# Patient Record
Sex: MALE | Race: BLACK OR AFRICAN AMERICAN | NOT HISPANIC OR LATINO | URBAN - METROPOLITAN AREA
[De-identification: names, ages, dates, MRNs, and addresses within clinical notes are randomized per-mention and may not be internally consistent; named-entity substitution may affect disease eponyms.]

---

## 2021-02-16 ENCOUNTER — INPATIENT (INPATIENT)
Facility: HOSPITAL | Age: 51
LOS: 0 days | Discharge: HOME | DRG: 603 | End: 2021-02-16
Attending: STUDENT IN AN ORGANIZED HEALTH CARE EDUCATION/TRAINING PROGRAM | Admitting: HOSPITALIST
Payer: COMMERCIAL

## 2021-02-16 ENCOUNTER — TRANSCRIPTION ENCOUNTER (OUTPATIENT)
Age: 51
End: 2021-02-16

## 2021-02-16 VITALS
TEMPERATURE: 99 F | DIASTOLIC BLOOD PRESSURE: 106 MMHG | RESPIRATION RATE: 18 BRPM | HEART RATE: 92 BPM | OXYGEN SATURATION: 98 % | SYSTOLIC BLOOD PRESSURE: 162 MMHG

## 2021-02-16 VITALS
WEIGHT: 265 LBS | SYSTOLIC BLOOD PRESSURE: 176 MMHG | RESPIRATION RATE: 18 BRPM | HEART RATE: 106 BPM | HEIGHT: 71 IN | DIASTOLIC BLOOD PRESSURE: 98 MMHG | OXYGEN SATURATION: 97 % | TEMPERATURE: 99 F

## 2021-02-16 DIAGNOSIS — E03.9 HYPOTHYROIDISM, UNSPECIFIED: ICD-10-CM

## 2021-02-16 DIAGNOSIS — L03.314 CELLULITIS OF GROIN: ICD-10-CM

## 2021-02-16 DIAGNOSIS — I10 ESSENTIAL (PRIMARY) HYPERTENSION: ICD-10-CM

## 2021-02-16 DIAGNOSIS — Z98.890 OTHER SPECIFIED POSTPROCEDURAL STATES: Chronic | ICD-10-CM

## 2021-02-16 DIAGNOSIS — N17.9 ACUTE KIDNEY FAILURE, UNSPECIFIED: ICD-10-CM

## 2021-02-16 DIAGNOSIS — Z29.9 ENCOUNTER FOR PROPHYLACTIC MEASURES, UNSPECIFIED: ICD-10-CM

## 2021-02-16 DIAGNOSIS — D64.9 ANEMIA, UNSPECIFIED: ICD-10-CM

## 2021-02-16 LAB
% ALBUMIN: 57.8 % — SIGNIFICANT CHANGE UP
% ALPHA 1: 4.7 % — SIGNIFICANT CHANGE UP
% ALPHA 2: 11.1 % — SIGNIFICANT CHANGE UP
% BETA: 10 % — SIGNIFICANT CHANGE UP
% GAMMA: 16.4 % — SIGNIFICANT CHANGE UP
ALBUMIN SERPL ELPH-MCNC: 4.1 G/DL — SIGNIFICANT CHANGE UP (ref 3.3–5)
ALBUMIN SERPL ELPH-MCNC: 4.1 G/DL — SIGNIFICANT CHANGE UP (ref 3.6–5.5)
ALBUMIN/GLOB SERPL ELPH: 1.4 RATIO — SIGNIFICANT CHANGE UP
ALP SERPL-CCNC: 70 U/L — SIGNIFICANT CHANGE UP (ref 40–120)
ALPHA1 GLOB SERPL ELPH-MCNC: 0.3 G/DL — SIGNIFICANT CHANGE UP (ref 0.1–0.4)
ALPHA2 GLOB SERPL ELPH-MCNC: 0.8 G/DL — SIGNIFICANT CHANGE UP (ref 0.5–1)
ALT FLD-CCNC: 17 U/L — SIGNIFICANT CHANGE UP (ref 10–45)
ANION GAP SERPL CALC-SCNC: 10 MMOL/L — SIGNIFICANT CHANGE UP (ref 5–17)
ANISOCYTOSIS BLD QL: SLIGHT — SIGNIFICANT CHANGE UP
APPEARANCE UR: CLEAR — SIGNIFICANT CHANGE UP
AST SERPL-CCNC: 19 U/L — SIGNIFICANT CHANGE UP (ref 10–40)
B-GLOBULIN SERPL ELPH-MCNC: 0.7 G/DL — SIGNIFICANT CHANGE UP (ref 0.5–1)
BACTERIA # UR AUTO: NEGATIVE — SIGNIFICANT CHANGE UP
BASOPHILS # BLD AUTO: 0 K/UL — SIGNIFICANT CHANGE UP (ref 0–0.2)
BASOPHILS NFR BLD AUTO: 0 % — SIGNIFICANT CHANGE UP (ref 0–2)
BILIRUB SERPL-MCNC: 0.4 MG/DL — SIGNIFICANT CHANGE UP (ref 0.2–1.2)
BILIRUB UR-MCNC: NEGATIVE — SIGNIFICANT CHANGE UP
BUN SERPL-MCNC: 15 MG/DL — SIGNIFICANT CHANGE UP (ref 7–23)
CALCIUM SERPL-MCNC: 9.3 MG/DL — SIGNIFICANT CHANGE UP (ref 8.4–10.5)
CHLORIDE SERPL-SCNC: 104 MMOL/L — SIGNIFICANT CHANGE UP (ref 96–108)
CO2 SERPL-SCNC: 26 MMOL/L — SIGNIFICANT CHANGE UP (ref 22–31)
COLOR SPEC: YELLOW — SIGNIFICANT CHANGE UP
CREAT ?TM UR-MCNC: 309 MG/DL — SIGNIFICANT CHANGE UP
CREAT SERPL-MCNC: 1.75 MG/DL — HIGH (ref 0.5–1.3)
DIFF PNL FLD: NEGATIVE — SIGNIFICANT CHANGE UP
EOSINOPHIL # BLD AUTO: 0.13 K/UL — SIGNIFICANT CHANGE UP (ref 0–0.5)
EOSINOPHIL NFR BLD AUTO: 1.8 % — SIGNIFICANT CHANGE UP (ref 0–6)
EPI CELLS # UR: 1 /HPF — SIGNIFICANT CHANGE UP
FERRITIN SERPL-MCNC: 135 NG/ML — SIGNIFICANT CHANGE UP (ref 30–400)
GAMMA GLOBULIN: 1.2 G/DL — SIGNIFICANT CHANGE UP (ref 0.6–1.6)
GLUCOSE SERPL-MCNC: 112 MG/DL — HIGH (ref 70–99)
GLUCOSE UR QL: NEGATIVE — SIGNIFICANT CHANGE UP
HCT VFR BLD CALC: 38.2 % — LOW (ref 39–50)
HGB BLD-MCNC: 11.9 G/DL — LOW (ref 13–17)
HYALINE CASTS # UR AUTO: 0 /LPF — SIGNIFICANT CHANGE UP (ref 0–2)
HYPOCHROMIA BLD QL: SLIGHT — SIGNIFICANT CHANGE UP
IRON SATN MFR SERPL: 16 UG/DL — LOW (ref 45–165)
IRON SATN MFR SERPL: 6 % — LOW (ref 16–55)
KETONES UR-MCNC: NEGATIVE — SIGNIFICANT CHANGE UP
LEUKOCYTE ESTERASE UR-ACNC: NEGATIVE — SIGNIFICANT CHANGE UP
LYMPHOCYTES # BLD AUTO: 0.98 K/UL — LOW (ref 1–3.3)
LYMPHOCYTES # BLD AUTO: 13.3 % — SIGNIFICANT CHANGE UP (ref 13–44)
MANUAL SMEAR VERIFICATION: SIGNIFICANT CHANGE UP
MCHC RBC-ENTMCNC: 21.9 PG — LOW (ref 27–34)
MCHC RBC-ENTMCNC: 31.2 GM/DL — LOW (ref 32–36)
MCV RBC AUTO: 70.3 FL — LOW (ref 80–100)
MICROCYTES BLD QL: SIGNIFICANT CHANGE UP
MONOCYTES # BLD AUTO: 0.26 K/UL — SIGNIFICANT CHANGE UP (ref 0–0.9)
MONOCYTES NFR BLD AUTO: 3.5 % — SIGNIFICANT CHANGE UP (ref 2–14)
NEUTROPHILS # BLD AUTO: 5.83 K/UL — SIGNIFICANT CHANGE UP (ref 1.8–7.4)
NEUTROPHILS NFR BLD AUTO: 78.8 % — HIGH (ref 43–77)
NITRITE UR-MCNC: NEGATIVE — SIGNIFICANT CHANGE UP
OVALOCYTES BLD QL SMEAR: SLIGHT — SIGNIFICANT CHANGE UP
PH UR: 6.5 — SIGNIFICANT CHANGE UP (ref 5–8)
PLAT MORPH BLD: NORMAL — SIGNIFICANT CHANGE UP
PLATELET # BLD AUTO: 258 K/UL — SIGNIFICANT CHANGE UP (ref 150–400)
POLYCHROMASIA BLD QL SMEAR: SLIGHT — SIGNIFICANT CHANGE UP
POTASSIUM SERPL-MCNC: 4.2 MMOL/L — SIGNIFICANT CHANGE UP (ref 3.5–5.3)
POTASSIUM SERPL-SCNC: 4.2 MMOL/L — SIGNIFICANT CHANGE UP (ref 3.5–5.3)
PROCALCITONIN SERPL-MCNC: 0.09 NG/ML — SIGNIFICANT CHANGE UP (ref 0.02–0.1)
PROT ?TM UR-MCNC: 18 MG/DL — HIGH (ref 0–12)
PROT PATTERN SERPL ELPH-IMP: SIGNIFICANT CHANGE UP
PROT SERPL-MCNC: 7.1 G/DL — SIGNIFICANT CHANGE UP (ref 6–8.3)
PROT SERPL-MCNC: 7.1 G/DL — SIGNIFICANT CHANGE UP (ref 6–8.3)
PROT UR-MCNC: ABNORMAL
PROT/CREAT UR-RTO: 0.1 RATIO — SIGNIFICANT CHANGE UP (ref 0–0.2)
RBC # BLD: 5.43 M/UL — SIGNIFICANT CHANGE UP (ref 4.2–5.8)
RBC # FLD: 15.8 % — HIGH (ref 10.3–14.5)
RBC BLD AUTO: ABNORMAL
RBC CASTS # UR COMP ASSIST: 1 /HPF — SIGNIFICANT CHANGE UP (ref 0–4)
SARS-COV-2 IGG SERPL QL IA: NEGATIVE — SIGNIFICANT CHANGE UP
SARS-COV-2 IGM SERPL IA-ACNC: 0.15 INDEX — SIGNIFICANT CHANGE UP
SARS-COV-2 RNA SPEC QL NAA+PROBE: SIGNIFICANT CHANGE UP
SODIUM SERPL-SCNC: 140 MMOL/L — SIGNIFICANT CHANGE UP (ref 135–145)
SODIUM UR-SCNC: 188 MMOL/L — SIGNIFICANT CHANGE UP
SP GR SPEC: 1.03 — HIGH (ref 1.01–1.02)
TIBC SERPL-MCNC: 260 UG/DL — SIGNIFICANT CHANGE UP (ref 220–430)
TRANSFERRIN SERPL-MCNC: 212 MG/DL — SIGNIFICANT CHANGE UP (ref 200–360)
UIBC SERPL-MCNC: 244 UG/DL — SIGNIFICANT CHANGE UP (ref 110–370)
UROBILINOGEN FLD QL: ABNORMAL
VARIANT LYMPHS # BLD: 2.6 % — SIGNIFICANT CHANGE UP (ref 0–6)
WBC # BLD: 7.4 K/UL — SIGNIFICANT CHANGE UP (ref 3.8–10.5)
WBC # FLD AUTO: 7.4 K/UL — SIGNIFICANT CHANGE UP (ref 3.8–10.5)
WBC UR QL: 1 /HPF — SIGNIFICANT CHANGE UP (ref 0–5)

## 2021-02-16 PROCEDURE — U0003: CPT

## 2021-02-16 PROCEDURE — 84300 ASSAY OF URINE SODIUM: CPT

## 2021-02-16 PROCEDURE — G0378: CPT

## 2021-02-16 PROCEDURE — 96375 TX/PRO/DX INJ NEW DRUG ADDON: CPT

## 2021-02-16 PROCEDURE — 99285 EMERGENCY DEPT VISIT HI MDM: CPT | Mod: 25

## 2021-02-16 PROCEDURE — 80053 COMPREHEN METABOLIC PANEL: CPT

## 2021-02-16 PROCEDURE — 82570 ASSAY OF URINE CREATININE: CPT

## 2021-02-16 PROCEDURE — 96376 TX/PRO/DX INJ SAME DRUG ADON: CPT

## 2021-02-16 PROCEDURE — 83550 IRON BINDING TEST: CPT

## 2021-02-16 PROCEDURE — 84145 PROCALCITONIN (PCT): CPT

## 2021-02-16 PROCEDURE — 72193 CT PELVIS W/DYE: CPT

## 2021-02-16 PROCEDURE — 99285 EMERGENCY DEPT VISIT HI MDM: CPT

## 2021-02-16 PROCEDURE — 83540 ASSAY OF IRON: CPT

## 2021-02-16 PROCEDURE — U0005: CPT

## 2021-02-16 PROCEDURE — 84165 PROTEIN E-PHORESIS SERUM: CPT

## 2021-02-16 PROCEDURE — 99223 1ST HOSP IP/OBS HIGH 75: CPT

## 2021-02-16 PROCEDURE — 84155 ASSAY OF PROTEIN SERUM: CPT

## 2021-02-16 PROCEDURE — 86769 SARS-COV-2 COVID-19 ANTIBODY: CPT

## 2021-02-16 PROCEDURE — 72193 CT PELVIS W/DYE: CPT | Mod: 26,MA

## 2021-02-16 PROCEDURE — 12345: CPT | Mod: NC

## 2021-02-16 PROCEDURE — 81001 URINALYSIS AUTO W/SCOPE: CPT

## 2021-02-16 PROCEDURE — 84466 ASSAY OF TRANSFERRIN: CPT

## 2021-02-16 PROCEDURE — 84156 ASSAY OF PROTEIN URINE: CPT

## 2021-02-16 PROCEDURE — 96374 THER/PROPH/DIAG INJ IV PUSH: CPT

## 2021-02-16 PROCEDURE — 85025 COMPLETE CBC W/AUTO DIFF WBC: CPT

## 2021-02-16 PROCEDURE — 82728 ASSAY OF FERRITIN: CPT

## 2021-02-16 RX ORDER — LEVOTHYROXINE SODIUM 125 MCG
125 TABLET ORAL DAILY
Refills: 0 | Status: DISCONTINUED | OUTPATIENT
Start: 2021-02-16 | End: 2021-02-16

## 2021-02-16 RX ORDER — CEFOTETAN DISODIUM 1 G
2 VIAL (EA) INJECTION EVERY 12 HOURS
Refills: 0 | Status: DISCONTINUED | OUTPATIENT
Start: 2021-02-16 | End: 2021-02-16

## 2021-02-16 RX ORDER — AMLODIPINE BESYLATE 2.5 MG/1
10 TABLET ORAL DAILY
Refills: 0 | Status: DISCONTINUED | OUTPATIENT
Start: 2021-02-17 | End: 2021-02-16

## 2021-02-16 RX ORDER — CARVEDILOL PHOSPHATE 80 MG/1
12.5 CAPSULE, EXTENDED RELEASE ORAL DAILY
Refills: 0 | Status: DISCONTINUED | OUTPATIENT
Start: 2021-02-16 | End: 2021-02-16

## 2021-02-16 RX ORDER — CEFOTETAN DISODIUM 1 G
2 VIAL (EA) INJECTION ONCE
Refills: 0 | Status: COMPLETED | OUTPATIENT
Start: 2021-02-16 | End: 2021-02-16

## 2021-02-16 RX ORDER — SODIUM CHLORIDE 9 MG/ML
1000 INJECTION, SOLUTION INTRAVENOUS ONCE
Refills: 0 | Status: COMPLETED | OUTPATIENT
Start: 2021-02-16 | End: 2021-02-16

## 2021-02-16 RX ORDER — KETOROLAC TROMETHAMINE 30 MG/ML
15 SYRINGE (ML) INJECTION ONCE
Refills: 0 | Status: DISCONTINUED | OUTPATIENT
Start: 2021-02-16 | End: 2021-02-16

## 2021-02-16 RX ORDER — AMLODIPINE BESYLATE 2.5 MG/1
10 TABLET ORAL ONCE
Refills: 0 | Status: COMPLETED | OUTPATIENT
Start: 2021-02-16 | End: 2021-02-16

## 2021-02-16 RX ORDER — CEFPODOXIME PROXETIL 100 MG
1 TABLET ORAL
Qty: 14 | Refills: 0
Start: 2021-02-16 | End: 2021-02-22

## 2021-02-16 RX ADMIN — CARVEDILOL PHOSPHATE 12.5 MILLIGRAM(S): 80 CAPSULE, EXTENDED RELEASE ORAL at 06:29

## 2021-02-16 RX ADMIN — Medication 100 GRAM(S): at 03:45

## 2021-02-16 RX ADMIN — Medication 110 MILLIGRAM(S): at 15:49

## 2021-02-16 RX ADMIN — SODIUM CHLORIDE 4000 MILLILITER(S): 9 INJECTION, SOLUTION INTRAVENOUS at 03:11

## 2021-02-16 RX ADMIN — Medication 100 GRAM(S): at 15:19

## 2021-02-16 RX ADMIN — Medication 110 MILLIGRAM(S): at 03:56

## 2021-02-16 RX ADMIN — Medication 125 MICROGRAM(S): at 06:29

## 2021-02-16 RX ADMIN — AMLODIPINE BESYLATE 10 MILLIGRAM(S): 2.5 TABLET ORAL at 05:54

## 2021-02-16 RX ADMIN — Medication 15 MILLIGRAM(S): at 03:11

## 2021-02-16 NOTE — ED ADULT NURSE NOTE - NSIMPLEMENTINTERV_GEN_ALL_ED
Implemented All Universal Safety Interventions:  [place] to call system. Call bell, personal items and telephone within reach. Instruct patient to call for assistance. Room bathroom lighting operational. Non-slip footwear when patient is off stretcher. Physically safe environment: no spills, clutter or unnecessary equipment. Stretcher in lowest position, wheels locked, appropriate side rails in place.

## 2021-02-16 NOTE — ED ADULT NURSE NOTE - OBJECTIVE STATEMENT
Pt is a 49 y/o male pmhx of HTN presents to the ED complaining of perianal rash x 2 days. Pt says he has had these little bump/rashes before on his legs that were small, but they would pop and go away. He says this one is causing him pain and has brown yellow drainage. Endorses having chills. Pt presents alert & oriented x 4, breathing spontaneous & nonlabored, Strength 5/5 x 4 extremities, ambulates without assist. Strong peripheral pulses noted b/l, no edema noted. Denies chest pain, SOB, abdominal pain, back pain, n/v/d, fever. Call bell within reach, bed in lowest position, side rails up, wheels locked.

## 2021-02-16 NOTE — PROVIDER CONTACT NOTE (OTHER) - ASSESSMENT
patient has hx hypertension, other vss, denies pain palpitations sob headache dizziness. patient states his bp is normally this high

## 2021-02-16 NOTE — H&P ADULT - NSHPPHYSICALEXAM_GEN_ALL_CORE
PHYSICAL EXAM:   GENERAL: Alert. Not confused. No acute distress. Not thin. Not cachectic. Not obese.  HEAD:  Atraumatic. Normocephalic.  EYES: EOMI. PERRLA. Normal conjunctiva/sclera.  ENT: Neck supple. No JVD. Moist oral mucosa. Not edentulous. No thrush.  LYMPH: Normal supraclavicular/cervical lymph nodes.   CARDIAC: No tachy, Not chen. Regular rhythm. Not irregularly irregular. S1. S2. No murmur. No rub. No distant heart sounds.  LUNG/CHEST: CTAB. BS equal bilaterally. No wheezes. No rales. No rhonchi.  ABDOMEN: Soft. No tenderness. No distension. No fluid wave. Normal bowel sounds.  BACK: No midline/vertebral tenderness. No flank tenderness.  VASCULAR: +2 b/l radial pulses. Palpable DP pulses.  EXTREMITIES:  No clubbing. No cyanosis. No edema. Moving all 4.  NEUROLOGY: A&Ox3. Non-focal exam. Cranial nerves intact. Normal speech. Sensation intact.  PSYCH: Normal behavior. Normal affect.  SKIN: + nontender, erythematous left perineal lesion (about 3 cm x1 cm) without active purulent drainage, slight induration, no crepitus  Vascular Access:     ICU Vital Signs Last 24 Hrs  T(C): 36.8 (16 Feb 2021 05:59), Max: 37.3 (16 Feb 2021 04:07)  T(F): 98.2 (16 Feb 2021 05:59), Max: 99.1 (16 Feb 2021 04:07)  HR: 88 (16 Feb 2021 05:59) (87 - 106)  BP: 162/105 (16 Feb 2021 05:59) (162/105 - 184/104)  BP(mean): --  ABP: --  ABP(mean): --  RR: 20 (16 Feb 2021 05:59) (18 - 20)  SpO2: 98% (16 Feb 2021 05:59) (97% - 100%)      I&O's Summary

## 2021-02-16 NOTE — H&P ADULT - HISTORY OF PRESENT ILLNESS
50 yr old man with PMH of hypertension and hypothyroidism who presents with L inner groin abscess. Pt endorses swelling, rash, and drainage for the last 3 days.  Patient notes that on Saturday he noticed a "small bump" in his left groin region which was larger in size appeared fluid filled on Sunday. Yesterday it appeared to look like a boil and wife squeezed it which caused it to burst.   He then noted a purulent yellowish/ brownish and bloody discharge with a foul odor. It has continued to drain since then w/ a foul odor.   Denies fevers and chills.      In ED, CT obtained showing Mild cellulitis of the left medial upper thigh and perineum. No abscess or subcutaneous gas collection. Low clinical concern for raymundo's.

## 2021-02-16 NOTE — CHART NOTE - NSCHARTNOTEFT_GEN_A_CORE
Patient seen and examined  Pain is much improved. No fever or chills.    1x2cm area of inflamed tissue w/ opening and serous drainage. No significant surrounding erythema.  CT neg for abscess  Suspect mild cellulitis due to furuncle, already self draining.  No fever, wbc or systemic signs of infection.   He is medically stable for discharge home today. Will complete 7 days of oral abx -doxy 100 bid and cefpodoxime 400 bid  Encourage pt to clean area w/ clean gauze/water 2 x daily and cover with gauze.  D/w PMD b/l creatinine is 1.5. Mild cr bump in setting of infection. Encourage pt to increase PO intake. Can have repeat labs as out pt to follow up on creatinine and mild anemia.    40 min spent coordinating care and planning for discharge

## 2021-02-16 NOTE — H&P ADULT - NSHPREVIEWOFSYSTEMS_GEN_ALL_CORE
REVIEW OF SYSTEMS:  CONSTITUTIONAL: No weakness. No fevers. No chills. No rigors. No weight loss. No night sweats. No poor appetite.  EYES: No blurry or double vision. No eye pain.  ENT: No hearing difficulty. No vertigo. No dysphagia. No sore throat. No Sinusitis/rhinorrhea.   NECK: No pain. No stiffness/rigidity.  CARDIAC: No chest pain. No palpitations. No lightheadedness. No syncope.  RESPIRATORY: No cough. No SOB. No hemoptysis.  GASTROINTESTINAL: No abdominal pain. No nausea. No vomiting. No hematemesis. No diarrhea. No constipation. No melena. No hematochezia.  GENITOURINARY: No dysuria. No frequency. No hesitancy. No hematuria. No oliguria.  NEUROLOGICAL: No numbness/tingling. No focal weakness. No urinary or fecal incontinence. No headache. No unsteady gait.  BACK: No back pain. No flank pain.  EXTREMITIES: No lower extremity edema. Full ROM. No joint pain.  SKIN: See HPI. No itching. No other lesions.  PSYCHIATRIC: No depression. No anxiety. No SI/HI.  ALLERGIC: No lip swelling. No hives.  All other review of systems is negative unless indicated above.  Unless indicated above, unable to assess ROS 2/2

## 2021-02-16 NOTE — H&P ADULT - PROBLEM SELECTOR PLAN 5
Elevated at admission; pt endorses not having taken any BP meds today  -c/w home med per pt: carvedilol 12.5 mg qd and norvasc 10 mg  - confirm med rec in am w/ pharamcy regarding carvedilol dosing (bid vs qd)

## 2021-02-16 NOTE — ED ADULT TRIAGE NOTE - CHIEF COMPLAINT QUOTE
Abscess in left groin. Worsening and growing over past 2 days. Reports pain, red/brown malodorous discharge.

## 2021-02-16 NOTE — DISCHARGE NOTE NURSING/CASE MANAGEMENT/SOCIAL WORK - PATIENT PORTAL LINK FT
You can access the FollowMyHealth Patient Portal offered by [facility] by registering at the following website: [email or web address]. By joining [employer]’s FollowMyHealth portal, you will also be able to view your health information using other applications (apps) compatible with our system.

## 2021-02-16 NOTE — H&P ADULT - ASSESSMENT
Telephone Encounter by Emerald Curiel at 11/21/17 04:15 PM     Author:  Emerald Curiel Service:  (none) Author Type:       Filed:  11/21/17 04:16 PM Encounter Date:  11/21/2017 Status:  Signed     :  Emerald Curiel ()            Received fax from[AC1.1T] [facility]'s division of gastroenterology and hepatology and nutrition Dr. Goldman's patient visit notes date of service 11/21/2017.[AC1.1M]  Fax placed in clinical staff work bin.[AC1.1T]  Electronically Signed by:    Emerald Curiel 11/21/2017[AC1.2T]          Revision History        User Key Date/Time User Provider Type Action    > AC1.2 11/21/17 04:16 PM Emerald Curiel  Sign     AC1.1 11/21/17 04:15 PM Emerald Curiel      M - Manual, T - Template             50 yr old gentleman w/ PMH of hypothyroidism and hypertension presents w/ L inguinal draining abscess. Lab findings also notable for anemia and LEROY w/ unclear baseline.

## 2021-02-16 NOTE — H&P ADULT - NSICDXFAMILYHX_GEN_ALL_CORE_FT
FAMILY HISTORY:  Family history of diabetes mellitus, dad, insulin dependent    Father  Still living? Unknown  Family history of prostate cancer, Age at diagnosis: Age Unknown    Mother  Still living? Unknown  Family history of hypertension, Age at diagnosis: Age Unknown

## 2021-02-16 NOTE — ED PROVIDER NOTE - SKIN, MLM
erythematous left perineal area with serosanginous drainage, slight fluctuance and induration, no crepitus

## 2021-02-16 NOTE — DISCHARGE NOTE PROVIDER - CARE PROVIDER_API CALL
Dylan Lobo  [facility]  Phone: [phone]  Fax: (   )    -  Follow Up Time: 1 week

## 2021-02-16 NOTE — DISCHARGE NOTE PROVIDER - PROVIDER TOKENS
FREE:[LAST:[Yamil],FIRST:[Dylan],PHONE:[phone]],FAX:[(   )    -],ADDRESS:[[facility]],FOLLOWUP:[1 week]]

## 2021-02-16 NOTE — ED ADULT NURSE REASSESSMENT NOTE - NS ED NURSE REASSESS COMMENT FT1
received pt from RN Candi for continued care. pt adm to hospital for eval of abscess to L groin area . pt is aaox4, sitting upright in stretcher watching tv. received morning BP meds. has call bell in reach

## 2021-02-16 NOTE — H&P ADULT - NSHPLABSRESULTS_GEN_ALL_CORE
Personally reviewed labs and imaging.                        11.9   7.40  )-----------( 258      ( 2021 02:08 )             38.2       02-16    140  |  104  |  15  ----------------------------<  112<H>  4.2   |  26  |  1.75<H>    Ca    9.3      2021 02:08    TPro  7.1  /  Alb  4.1  /  TBili  0.4  /  DBili  x   /  AST  19  /  ALT  17  /  AlkPhos  70  02-16  LIVER FUNCTIONS - ( 2021 02:08 )  Alb: 4.1 g/dL / Pro: 7.1 g/dL / ALK PHOS: 70 U/L / ALT: 17 U/L / AST: 19 U/L / GGT: x           Urinalysis Basic - ( 2021 04:17 )  Color: Yellow / Appearance: Clear / S.030 / pH: x  Gluc: x / Ketone: Negative  / Bili: Negative / Urobili: 4 mg/dL   Blood: x / Protein: Trace / Nitrite: Negative   Leuk Esterase: Negative / RBC: 1 /hpf / WBC 1 /HPF   Sq Epi: x / Non Sq Epi: 1 /hpf / Bacteria: Negative    CT w/ Mild cellulitis of the left medial upper thigh and perineum. No abscess or subcutaneous gas collection.

## 2021-02-16 NOTE — DISCHARGE NOTE PROVIDER - NSDCMRMEDTOKEN_GEN_ALL_CORE_FT
amLODIPine 10 mg oral tablet: 1 tab(s) orally once a day  carvedilol 12.5 mg oral tablet: 1 tab(s) orally once a day  levothyroxine 125 mcg (0.125 mg) oral tablet: 1 tab(s) orally once a day   amLODIPine 10 mg oral tablet: 1 tab(s) orally once a day  carvedilol 12.5 mg oral tablet: 1 tab(s) orally once a day  cefpodoxime 200 mg oral tablet: 1 tab(s) orally 2 times a day   doxycycline hyclate 100 mg oral tablet: 1 tab(s) orally 2 times a day   levothyroxine 125 mcg (0.125 mg) oral tablet: 1 tab(s) orally once a day

## 2021-02-16 NOTE — DISCHARGE NOTE PROVIDER - NSDCCPCAREPLAN_GEN_ALL_CORE_FT
PRINCIPAL DISCHARGE DIAGNOSIS  Diagnosis: Cellulitis of groin  Assessment and Plan of Treatment: You were found to have a skin infection of the left leg/groin. You have been prescribed two antibiotics to treat this -doxycycline and cefpodoxime. Both are to be taken twice a day for a total of 7 days. You should feel clear improvement in 1-2 days. If you have worsening pain, redness or drainage at the site, or develop high fevers or feel unwell Please seek urgent medical care. Please follow up with your PMD in 1-2 weeks regardless.      SECONDARY DISCHARGE DIAGNOSES  Diagnosis: Chronic kidney disease  Assessment and Plan of Treatment: You were incidentally found to have reduced kidney function. A number called your creatinine was elevated at 1.7 (normal 1). Please be sure to drink fluids and stay hydrated to help your kidneys. Please follow up with your PMD in 1-2 weeks for repeat blood work to monitor your kidney function.    Diagnosis: Anemia  Assessment and Plan of Treatment: You were also noted to have mild anemia with a hemoglobin of 11.9. This should be investigated further by your PMD for causes of anemia such as iron deficiency and you may need additional testing. Please follow up with your PMD as requested.     PRINCIPAL DISCHARGE DIAGNOSIS  Diagnosis: Cellulitis of groin  Assessment and Plan of Treatment: You were found to have a skin infection of the left leg/groin. You have been prescribed two antibiotics to treat this -doxycycline and cefpodoxime. Both are to be taken twice a day for a total of 7 days. You should feel clear improvement in 1-2 days. Please clean the area daily with clean water and gentle soap and keep covered with clean gauze. If you have worsening pain, redness or drainage at the site, or develop high fevers or feel unwell please seek urgent medical care. Please follow up with your PMD in 1-2 weeks regardless to ensure proper healing.      SECONDARY DISCHARGE DIAGNOSES  Diagnosis: Anemia  Assessment and Plan of Treatment: You were also noted to have mild anemia with a hemoglobin of 11.9. This should be investigated further by your PMD for causes of anemia such as iron deficiency and you may need additional testing. Please follow up with your PMD as requested.    Diagnosis: Chronic kidney disease  Assessment and Plan of Treatment: You were incidentally found to have reduced kidney function. A number called your creatinine was elevated at 1.7 (normal 1). Please be sure to drink fluids and stay hydrated to help your kidneys. Please follow up with your PMD in 1-2 weeks for repeat blood work to monitor your kidney function.

## 2021-02-16 NOTE — DISCHARGE NOTE PROVIDER - HOSPITAL COURSE
50 yr old gentleman w/ PMH of hypothyroidism and hypertension presents w/ L inguinal draining abscess. No clinical evidence of Fourniers and no deep tissue infection. Lab findings also notable for anemia and elevated creatinine. Confirmed b/w PCP b/l creatinine 1.3-1.5. Suspect mild LEROY vs progression of known CKD. S/p IVF. Patient encouraged to increase PO fluid and follow up PMD for repeat labs in 1-2 weeks. Pain is already improved w/ self drainage and no collection seen on CT to drain. Okay to complete oral abx as outpatient. D/w patient incidental findings of renal disease and anemia and need for close PMD follow up. Discussed alarm symptoms including spreading erythema, increased pain, increased drainage, fever and chills which should prompt urgent eval. Patient expressed understanding. Discussed need for close follow up with PMD.

## 2021-02-16 NOTE — ED PROVIDER NOTE - OBJECTIVE STATEMENT
49 yo male with unremarkable pmhx presenting with perineal swelling, rash, and drainage for 3 days. states he had a bump and redness in his perineal area, has been getting larger and more uncomfortable, now draining fluid. Came to ED for further evaluation.    Denies fevers, N/V/D, CP, SOB 51 yo male with pmhx htn, hypothyroidism, presenting with perineal swelling, rash, and drainage for 3 days. states he had a bump and redness in his perineal area, has been getting larger and more uncomfortable, now draining fluid. Came to ED for further evaluation.    Denies fevers, N/V/D, CP, SOB

## 2021-02-16 NOTE — H&P ADULT - PROBLEM SELECTOR PLAN 1
L inguinal cellulitis/ drainig abscess; w/o crepitus or signs of Fitz's; CT w/ Mild cellulitis of the left medial upper thigh and perineum. No abscess or subcutaneous gas collection.  - c/w abx: doxycycline and cefotetan  - consider ID consult in am L inguinal cellulitis/ drainig abscess; w/o crepitus or signs of Fitz's; CT w/ Mild cellulitis of the left medial upper thigh and perineum. No abscess or subcutaneous gas collection.  - c/w abx: doxycycline 100 mg IV BID and cefotetan 2 gm IV bid  - consider ID consult in am

## 2021-02-16 NOTE — PROVIDER CONTACT NOTE (OTHER) - RECOMMENDATIONS
patient to be discharged at this time, follow up with primary care provider within 1-3 days regarding adjusting BP medications. will cont to monitor.

## 2021-02-16 NOTE — H&P ADULT - NSHPSOCIALHISTORY_GEN_ALL_CORE
Denies smoking and etoh  Lives w/ wife  Previously worked at [employer], now self-employed also working w/ [employer]

## 2021-02-16 NOTE — H&P ADULT - PROBLEM SELECTOR PLAN 2
- unclear baseline, pt does not endorse hx of CKD; sCr 1.76 at admission, may have component of hypertensive dz  - follow up urine lytes and urine protein/cr ratio, spep, upep (also w/ anemia)  - PCP: Dr. Delatorre ([phone]) for baseline labs in am

## 2021-02-16 NOTE — ED PROVIDER NOTE - ATTENDING CONTRIBUTION TO CARE
------------ATTENDING NOTE------------   pt c/o 3 days of significantly increasing swelling, redness, and constant moderate ache to L inner groin, exam w/ cellulitis, draining abscess, chills at home, awaiting labs/imaging and close reassessments, empiric antibiotics -->  - Hamzah Marte MD   -----------------------------------------------

## 2021-02-16 NOTE — DISCHARGE NOTE PROVIDER - NSDCCPTREATMENT_GEN_ALL_CORE_FT
PRINCIPAL PROCEDURE  Procedure: CT pelvis  Findings and Treatment: FINDINGS:  BLADDER: Within normal limits.  REPRODUCTIVE ORGANS: Median lobe of the prostate gland indents upon the base of the urinary bladder.  LYMPH NODES: No pelvic lymphadenopathy.  VISUALIZED PORTIONS:  ABDOMINAL ORGANS: Within normal limits.  BOWEL: Within normal limits.  PERITONEUM: No ascites.  VESSELS: Within normal limits.  ABDOMINAL WALL: Small bilateral fat-containing inguinal hernias. Mild subcutaneous fat infiltration of the left medial upper thigh and perineum. No fluid collection or subcutaneous gas. Subcentimeter left inguinal lymph nodes.  BONES: Within normal limits.  IMPRESSION:  Mild cellulitis of the left medial upper thigh and perineum. No abscess or subcutaneous gas collection.

## 2021-04-03 ENCOUNTER — EMERGENCY (EMERGENCY)
Facility: HOSPITAL | Age: 51
LOS: 1 days | Discharge: HOME | End: 2021-04-03
Attending: EMERGENCY MEDICINE | Admitting: EMERGENCY MEDICINE
Payer: COMMERCIAL

## 2021-04-03 VITALS
SYSTOLIC BLOOD PRESSURE: 107 MMHG | RESPIRATION RATE: 18 BRPM | OXYGEN SATURATION: 99 % | HEIGHT: 71 IN | HEART RATE: 82 BPM | DIASTOLIC BLOOD PRESSURE: 69 MMHG | TEMPERATURE: 98 F

## 2021-04-03 VITALS
RESPIRATION RATE: 18 BRPM | OXYGEN SATURATION: 100 % | DIASTOLIC BLOOD PRESSURE: 72 MMHG | TEMPERATURE: 98 F | SYSTOLIC BLOOD PRESSURE: 139 MMHG | HEART RATE: 87 BPM

## 2021-04-03 DIAGNOSIS — Z98.890 OTHER SPECIFIED POSTPROCEDURAL STATES: Chronic | ICD-10-CM

## 2021-04-03 PROCEDURE — 99284 EMERGENCY DEPT VISIT MOD MDM: CPT

## 2021-04-03 NOTE — ED ADULT NURSE NOTE - CHIEF COMPLAINT QUOTE
abd pain and weakness    pt c/o right sided "gas pain" rad to the back, weakness since 2200. states ha been fasting for [employer] reasons and lost 20 lbs in 3 weeks. no nausea, vomiting, diarrhea, fever, cough, sob.   no gross neuro deficits.  hx of htn, hypothyroidism.  bp lower than normal- 107/69.  pt awake and alert

## 2021-04-03 NOTE — ED ADULT NURSE NOTE - OBJECTIVE STATEMENT
Received pt to rm 8, A&Ox4, ambulatory. 49y/o male hx of HTN, hypothyroid complaining of generalized "gas" pains that pt states radiates to back. Once arrived to room, pt states "I just belched and now my pain went away." Upon palpation, pt denies any tenderness, nondistended. Resps are even and unlabored, spo2 100% on RA. denies chest pain, dizziness, fever/chills, weakness, palpations. Awaiting Md orders at this time.

## 2021-04-03 NOTE — ED PROVIDER NOTE - PHYSICAL EXAMINATION
Temo LENTZ MD PGY3:   PHYSICAL EXAM:    GENERAL: NAD, well-developed  HEENT:  Atraumatic, Normocephalic  CHEST/LUNG: Chest rise equal bilaterally  HEART: Regular rate and rhythm  ABDOMEN: Soft, Nontender, Nondistended. Negative Campos's sign. No McBurney's point TTP.   EXTREMITIES:  2+ Peripheral Pulses.  PSYCH: A&Ox3  SKIN: No obvious rashes or lesions

## 2021-04-03 NOTE — ED ADULT TRIAGE NOTE - CADM TRG TX PRIOR TO ARRIVAL
[FreeTextEntry1] : Patient is a 39-year-old white female with known history of bronchospastic lung disease as well as gastroparesis.  She has been maintained on low-dose beta-blocker for a chronic sinus tachycardia elevations and that those did cause some respiratory wheezing.  Patient has continued symptoms of breathlessness typically after eating meals both at rest and with exertion.  Her pulmonologist and gastroenterologist wanted to rule out a cardiac etiology.  Prior echocardiogram revealed preserved LV function with mild valvular aortic insufficiency.  Patient has no signs of edema.
see ambulance record

## 2021-04-03 NOTE — ED PROVIDER NOTE - ATTENDING CONTRIBUTION TO CARE
MD Cam:  I performed a face to face bedside interview with patient regarding history of present illness, review of symptoms and past medical history. I completed an independent physical exam(documented below).  I have discussed patient's plan of care with resident.   I agree with note as stated above, having amended the EMR as needed to reflect my findings. I have discussed the assessment and plan of care.  This includes during the time I functioned as the attending physician for this patient.  PE:  Gen: Alert, NAD  Head: NC, AT,  EOMI, normal lids/conjunctiva  ENT:  normal hearing, patent oropharynx without erythema/exudate  Neck: +supple, no tenderness/meningismus/JVD, +Trachea midline  Chest: no chest wall tenderness, equal chest rise  Pulm: Bilateral BS, normal resp effort, no wheeze/stridor/retractions  CV: RRR, no M/R/G, +dist pulses  Abd: +BS, soft, NT/ND, negative hightower's, no ttp even with deep palpation of RUQ  Rectal: deferred  Mskel: no edema/erythema/cyanosis  Skin: no rash  Neuro: AAOx3  MDM:   51yo M w/ pmh of htn and hypothyroidism bibems for eval of RUQ abd pain which began suddenly while at [employer], migratory to R mid back and then back to RUQ, felt like gas pain, completely resolved after very large belch while in ambulance. Asymptomatic at this time. Denies cp, sob, diaphoresis or palpitations at any point. Other than htn, no other ACS risk factors. PE is unremarkable. No indication for any further test at this time. Will dc w/ pmd f/u.

## 2021-04-03 NOTE — ED PROVIDER NOTE - FAMILY HISTORY
Family history of diabetes mellitus, dad, insulin dependent     Mother  Still living? Unknown  Family history of hypertension, Age at diagnosis: Age Unknown     Father  Still living? Unknown  Family history of prostate cancer, Age at diagnosis: Age Unknown

## 2021-04-03 NOTE — ED PROVIDER NOTE - OBJECTIVE STATEMENT
Temo LENTZ MD PGY3: 50 M hx HTN and hypothyroidism here for 1 episode of R sided upper abdominal pain radiating to the back and down the abdomen that elf resolved after he belched. Stated he was in [employer] and was sitting on the organ stool when he started to feel "gas pains" on the right side of his abdomen, radiated to the back. No nausea, vomiting. Stated that the pain got worse and his sister told him that he should take some gingerale. Took some gingerale, didn't improve the pain. Was in waiting room when he belched, and his pain completely resolved. Passing gas from below. Last BM early last evening. No complaints at this time. States he feels fine. No hx of chest pain or ACS. No leg swelling or hx of VTE.

## 2021-04-03 NOTE — ED ADULT TRIAGE NOTE - CHIEF COMPLAINT QUOTE
abd pain and weakness    pt c/o right sided "gas pain" rad to the back, weakness since 2200. states ha been fasting for [employer]ian[employer] reasons and lost 20 lbs in 3 weeks. no nausea, vomiting, diarrhea, fever, cough, sob.   no gross neuro deficits.  hx of htn, hypothyroidism.  bp lower than normal- 107/69.  pt awake and alert

## 2021-04-03 NOTE — ED PROVIDER NOTE - PATIENT PORTAL LINK FT
You can access the FollowMyHealth Patient Portal offered by [facility] by registering at the following website: [email or web address]. By joining [employer]’s FollowMyHealth portal, you will also be able to view your health information using other applications (apps) compatible with our system.

## 2021-04-03 NOTE — ED PROVIDER NOTE - NSFOLLOWUPINSTRUCTIONS_ED_ALL_ED_FT
Please return to the ED for any concerns including worsening abdominal pain, nausea, vomiting, change in bowel movements, or any other concerns.     Please follow-up with your primary care doctor in the next week.

## 2021-04-03 NOTE — ED PROVIDER NOTE - CLINICAL SUMMARY MEDICAL DECISION MAKING FREE TEXT BOX
Temo LENTZ MD PGY3: 50 M here with R subcostal abdominal pain radiating up and down that resolved after patient belched. Normal abdominal exam. Normal EKG. No nausea/vomiting. Patient already tolerated Po on the way here with the gingerale. Will d/c home with return precations. Ddx includes biliary colic. Low suspicion for ACS given R sided abdominal pain, not L sided chest pain. Low suspicion for PE, negative PERC.

## 2021-04-03 NOTE — ED ADULT NURSE NOTE - PRO INTERPRETER NEED 2
Admission Date:   4/6/2017    Requesting Consultation:   Kevin Tavarez MD    Primary Care Physician:  Kodak Danielson MD    Chief Complaint:  Right knee pain    HPI:  This jasiel 69 yr old  woman is well known to me from previous preop encounters. She underwent a left total knee arthroplasty in 2015 -needed a manipulation postop but regained her motion in that knee and ultimately is very happy with it. She continues to have right knee pain and stiffness , is limping , has known advanced osteoarthritis in that knee and is ready for her arthroplasty. She lives alone, went to an inpatient rehab facility with her first knee but this time plans to have home physical therapy for 2 weeks and then outpatient therapy at [facility]. She has an exercise bike and home and is aware of how important its use it postop.   She is not using any assistive devices at present but does have a walker, cane etc at home. Her pain level most of the time is at least a 3-4/10 even at rest, it interrupts her sleep etc .   She does have lymphedema also of both lower extremities, is not having her legs wrapped and attends the lymphedema clinic at [facility]. She currently takes Gabapentin and Tylenol arthritis for pain relief. This helps somewhat.  Other Concurrent Medical Problems:  Past Medical History:   Diagnosis Date   • Allergy    • Angio-edema    • Arthritis    • Cataract    • Diabetes mellitus    • Essential (primary) hypertension    • GERD (gastroesophageal reflux disease)    • Hyperlipidemia    • Lymph edema    • Neuromuscular disorder    • Obesity    • PONV (postoperative nausea and vomiting)    • Urinary incontinence    • Urticaria    SHE HAD ANAPHYLAXIS FROM VANCOMYCIN (ANGIOEDEMA) -this was given to her with her first knee replacement due to her positive MRSA swab -she was in the ICU postop for 4 days .      Past Surgical History:  Past Surgical History:   Procedure Laterality Date   • BREAST SURGERY      biopsy/cyst removal   •  CHOLECYSTECTOMY     • COLONOSCOPY DIAGNOSTIC  3/12/15    Affi 5yr recall, poor prep   • HYSTERECTOMY     • MANIPULATN KNEE JT+ANESTHESIA Left 7/20/15    Dr. Tavarez   • REMOVAL GALLBLADDER     • TOTAL KNEE REPLACEMENT Left 6/3/15    Dr. Tavarez       she has had no adverse reaction to anesthesia with any of the above surgical procedures and has no family history of any known adverse reaction to anesthesia.    ALLERGIES:   Allergen Reactions   • Lisinopril SWELLING     Angioedema of tongue, bottom lip   • Naproxen ANAPHYLAXIS     Tongue swelling   • Vancomycin SWELLING     Angioedema, swollen tongue and airway tissue during intubation for LEFT TOTAL KNEE REPLACEMENT 6/3/2015. Possible contributing agent is vancomycin which was given prior to the event. Patient also received oxycodone, midazolam, lactated ringers and ropivacaine prior to the event. Opiates and vancomycin cause direct mast cell degranulation and in combination may lead to angioedema. Patient is tolerating oxycodone alone post operatively.   • Nsaids HIVES     Tongue and lips swell   • Zyrtec [Cetirizine] CARDIAC DISTURBANCES       she has no  history of any cancers.    Current Medications:  Current Outpatient Prescriptions   Medication Sig Dispense Refill   • ferrous sulfate 325 (65 FE) MG tablet Take 325 mg by mouth daily (with breakfast).     • Multiple Vitamin tablet Take 1 tablet by mouth daily.     • Omega-3 Fatty Acids (FISH OIL) 1200 MG capsule Take 1,200 mg by mouth daily.     • ascorbic acid (VITAMIN C) 500 MG tablet Take 500 mg by mouth daily.     • cholecalciferol (VITAMIN D3) 1000 UNITS tablet Take 1,000 Units by mouth daily.     • magnesium gluconate (MAGONATE) 500 MG tablet Take 500 mg by mouth 2 times daily.     • hydrochlorothiazide (HYDRODIURIL) 25 MG tablet Take 1 tablet by mouth daily. 90 tablet 3   • gabapentin (NEURONTIN) 300 MG capsule Two capsules po BID prn 120 capsule 1   • metoPROLOL (LOPRESSOR) 50 MG tablet Take 1 tablet by  mouth 2 times daily. 180 tablet 5   • metFORMIN (GLUCOPHAGE) 500 MG tablet Take 1 tablet by mouth daily (with breakfast). 90 tablet 1   • amLODIPine (NORVASC) 10 MG tablet Take 1 tablet by mouth daily. 90 tablet 5   • pravastatin (PRAVACHOL) 20 MG tablet Take 1 tablet by mouth daily. 30 tablet 5   • betamethasone diprop (DIPROLENE) 0.05 % cream Apply to affected area BID 45 g 0   • diphenhyd-lidocaine-nystatin (FIRST-BXN MOUTHWASH) suspension Swish 4 times daily after surgery for 1 week 1 Bottle 0   • warfarin (COUMADIN) 2 MG tablet Night before surgery take 5 mg (2.5 pills). Take instructed dose at 6:00pm daily. See discharge instructions for postoperative dose. 50 tablet 0   • nystatin (MYCOSTATIN) cream Apply twice daily to affected area 30 g 0   • clotrimazole-betamethasone (LOTRISONE) cream One po BID prn 45 g 0   • Incontinence Supply Disposable (PREVAIL MAXIMUM ABSORBENCY) Pads 1 each as needed (incontience). 120 each 12   • Incontinence Supply Disposable (DEPEND UNDERGARMENTS) Misc Use as needed for incontinence, Size  each 12     No current facility-administered medications for this encounter.          Social History:  Social History   Substance Use Topics   • Smoking status: Never Smoker   • Smokeless tobacco: Never Used   • Alcohol use No   she is retired from [employer], lives alone. Her son lives out of town. She plans home PT initially .    Family History:  Family History   Problem Relation Age of Onset   • Heart disease Mother 60     MI   • Hypertension Mother    • High cholesterol Mother    • Heart disease Father 60     MI   • Hypertension Father    • High cholesterol Father    • Cancer Sister 42     breast ca   • Cancer Maternal Grandfather    • Cancer Brother      , throat ca   • Angioedema Daughter    • Angioedema Brother    • Cancer Maternal Grandmother        Review of Systems:  CONSTITUTIONAL:  she has had no recent illness, fever or chills.  HEMATOLOGIC:  she denies anemia, sickle cell  disease or trait.  CARDIAC:  she has a history of hypertension, hyperlipidemia.  she has a remote  history of any chest pains.  No history of cardiac events.  No history of palpitations.  She has had a stress test and ECHO results in epic.   VASCULAR:  she denies DVT or claudication. Bilateral lymphedema of lower extremities treated   PULMONARY: she does not have asthma.  she has no dyspnea on exertion.  No chronic cough, no known sleep apnea.  GASTROINTESTINAL: she has no history of GERD.  she has no history of chronic constipation, diarrhea, hematemesis or melena.  She has a past history of PONV   GENITOURINARY: she has no episodes of urinary frequency.  she has no hematuria or dysuria.she has a history of stress urinary incontinence   MUSCULOSKELETAL: she has no back issues. RIGHT knee pain and known advanced osteoarthritis   NEUROLOGIC:  she denies seizures, dizziness, weakness or chronic headaches.  PSYCHOLOGICAL: she denies anxiety or depression.  TRANSFUSION HISTORY:  None  METABOLIC:  she denies, thyroid or liver problems.she has been told she has \"prediabetes\" and takes low dose Metformin once daily . She does not check her blood sugars.   HEAD AND NECK: she denies chronic sinus or hearing problems.   SKIN:  she has no open wounds, tattoos or body piercing's.  REPRODUCTIVE HISTORY:  Gravid, menopausal     Physical Exam:  GENERAL:  Very pleasant well developed woman seated in the exam room. She is known to us from previous preop encounters  VITAL SIGNS:  Blood pressure 151/89, pulse 78, temperature 96.5 °F (35.8 °C), resp. rate 18, SpO2 97 %.  Estimated body mass index is 43.93 kg/(m^2) as calculated from the following:    Height as of an earlier encounter on 4/6/17: 5' 3\" (1.6 m).    Weight as of an earlier encounter on 4/6/17: 112.5 kg.  HEAD AND NECK:  she is normocephalic, atraumatic.  Facies are symmetrical.  Eyes:  PERRL.  Sclerae are anicteric.  Nose:  Nasal mucosa appears moist.  Mouth:  Oropharynx  is pink and moist. Ears:  External auditory canals appear intact.  Hearing is intact as well.  Neck is non tender to palpation.  There is no cervical lymphadenopathy appreciated.  Thyroid is without lumps or masses and trachea appears to be midline.    PULMONARY:  No increased respiratory effort was observed.  Breath sounds are clear to auscultation, equal bilaterally with good air exchange.  No wheezes,crackles  or rhonchi were heard.  CARDIOVASCULAR:  Normal sinus rhythm.  I did not hear a heart murmur.  There is no JVD.  There is no calf tenderness bilaterally.  Pulses including carotid, radial and dorsalis pedis pulses are 2+ and equal bilaterally.  ABDOMEN:  OBESE, soft, not appreciably tender to palpation.  There were no palpable masses, normal bowel sounds x4 quadrants.  No hepatosplenomegaly is appreciated.  EXTREMITIES:  No clubbing was seen, no deformities, no cyanosis. Knee not examined  SPINE: non tender to touch no CVA tenderness'  SKIN:  Warm, dry to the touch.  No rashes or lesions were seen.  No spider angiomas are seen and nailbeds are pink.  NEUROLOGIC:  she is alert and oriented x3.  Cranial nerves 2-12 grossly intact.  No tremors are seen.    GENITOURINARY AND BREASTS:  Deferred.    An EKG was done with revealed NSR T wave abnormality- tracing unchanged from previous    Preoperative laboratory testing reveals Results for FINESSE ESTRADA (MRN [id number]) as of 4/7/2017 08:19   Ref. Range 4/6/2017 13:47 4/6/2017 13:55 4/6/2017 14:00 4/6/2017 14:19   Sodium Latest Ref Range: 135 - 145 mmol/L 141      Potassium Latest Ref Range: 3.4 - 5.1 mmol/L 3.4      Chloride Latest Ref Range: 98 - 107 mmol/L 103      CO2 Latest Ref Range: 21 - 32 mmol/L 28      ANION GAP Latest Ref Range: 10 - 20 mmol/L 13      A/G Ratio, Serum Latest Ref Range: 1.0 - 2.4  0.8 (L)      Albumin Latest Ref Range: 3.6 - 5.1 g/dL 3.8      ALK PHOSPHATASE Latest Ref Range: 45 - 117 Units/L 93      ALT/SGPT Latest Ref Range: <79 Units/L  33      AST/SGOT Latest Ref Range: <38 Units/L 24      BUN Latest Ref Range: 6 - 20 mg/dL 15      BUN/CREATININE RATIO Latest Ref Range: 7 - 25  21      CALCIUM Latest Ref Range: 8.4 - 10.2 mg/dL 9.4      Creatinine Latest Ref Range: 0.51 - 0.95 mg/dL 0.71      GFR Estimate,  Unknown >90      GFR Estimate, Non  Unknown 88      GLOBULIN Latest Ref Range: 2.0 - 4.0 g/dL 4.5 (H)      Glucose Latest Ref Range: 65 - 99 mg/dL 155 (H)      TOTAL BILIRUBIN Latest Ref Range: 0.2 - 1.0 mg/dL 0.2      TOTAL PROTEIN Latest Ref Range: 6.4 - 8.2 g/dL 8.3 (H)      WBC Latest Ref Range: 4.2 - 11.0 K/mcL 7.0      RBC Latest Ref Range: 4.00 - 5.20 mil/mcL 4.85      HGB Latest Ref Range: 12.0 - 15.5 g/dL 14.3      HCT Latest Ref Range: 36.0 - 46.5 % 42.3      MCV Latest Ref Range: 78.0 - 100.0 fl 87.2      MCH Latest Ref Range: 26.0 - 34.0 pg 29.5      MCHC Latest Ref Range: 32.0 - 36.5 g/dL 33.8      RDW-CV Latest Ref Range: 11.0 - 15.0 % 13.6      PLT Latest Ref Range: 140 - 450 K/mcL 322      DIFFERENTIAL TYPE Unknown AUTOMATED DIFFERE...      Neutrophil Latest Units: % 46      LYMPH Latest Units: % 45      MONO Latest Units: % 8      EOSIN Latest Units: % 1      BASO Latest Units: % 0      Absolute Neutrophil Latest Ref Range: 1.8 - 7.7 K/mcL 3.2      Absolute Lymph Latest Ref Range: 1.0 - 4.0 K/mcL 3.2      Absolute Mono Latest Ref Range: 0.3 - 0.9 K/mcL 0.6      Absolute Eos Latest Ref Range: 0.1 - 0.5 K/mcL 0.1      Absolute Baso Latest Ref Range: 0.0 - 0.3 K/mcL 0.0      INR Unknown 1.0      PROTIME Latest Ref Range: 9.7 - 11.8 sec 11.0      ANTIBODY SCREEN Unknown NEGATIVE      URINE CULTURE INCLUDE SENS Unknown    Rpt   S AUREUS/MRSA PCR Latest Ref Range: NOT DETECTED    POSITIVE (A)    S AUREUS/MRSA SOURCE Unknown   NARES    S Aureus PCR Latest Ref Range: NOT DETECTED    POSITIVE (A)    ELECTROCARDIOGRAM 12-LEAD Unknown  Rpt     Ventricular Rate EKG/Min (BPM) Unknown  59     Atrial Rate (BPM)  Unknown  59     AL-Interval (MSEC) Unknown  160     QRS-Interval (MSEC) Unknown  94     QT-Interval (MSEC) Unknown  416     QTc Unknown  411     P Axis (Degrees) Unknown  51     R Axis (Degrees) Unknown  34     T Axis (Degrees) Unknown  -21     Of note she was unable to give enough urine for a urinalysis so only a culture was sent which is pending      IMPRESSION:  Right knee pain with known advanced osteoarthritis in a patient with treated medical issues and overall fairly good functional capacity  HTN treated  Lymphedema treated  Morbid obesity  Diabetes II  Hyperlipidemia  MRSA positive which is unchanged  History of angioedema presumed due to Vancomycin     PLAN:  Right total knee arthroplasty moderate risk procedure in a patient with stable medical issues  I will review the rest of her labs when available  She will need Bactroban preop  I see no need for additional testing at present.     The hospitalist service will be happy to assist you in her care should the need arise.    Dictated by:  Maddison Salguero PA-C  4/7/2017    Signing Provider:  Doyle Aguilar MD                   English

## 2021-07-31 ENCOUNTER — INPATIENT (INPATIENT)
Facility: HOSPITAL | Age: 51
LOS: 0 days | Discharge: HOME | DRG: 287 | End: 2021-08-01
Attending: STUDENT IN AN ORGANIZED HEALTH CARE EDUCATION/TRAINING PROGRAM | Admitting: INTERNAL MEDICINE
Payer: COMMERCIAL

## 2021-07-31 VITALS
OXYGEN SATURATION: 98 % | WEIGHT: 278 LBS | HEART RATE: 81 BPM | TEMPERATURE: 98 F | DIASTOLIC BLOOD PRESSURE: 100 MMHG | HEIGHT: 71 IN | RESPIRATION RATE: 18 BRPM | SYSTOLIC BLOOD PRESSURE: 167 MMHG

## 2021-07-31 DIAGNOSIS — Z98.890 OTHER SPECIFIED POSTPROCEDURAL STATES: Chronic | ICD-10-CM

## 2021-07-31 DIAGNOSIS — R07.9 CHEST PAIN, UNSPECIFIED: ICD-10-CM

## 2021-07-31 LAB
ALBUMIN SERPL ELPH-MCNC: 4.4 G/DL — SIGNIFICANT CHANGE UP (ref 3.3–5)
ALBUMIN SERPL ELPH-MCNC: 5 G/DL — SIGNIFICANT CHANGE UP (ref 3.3–5)
ALP SERPL-CCNC: 69 U/L — SIGNIFICANT CHANGE UP (ref 40–120)
ALP SERPL-CCNC: 79 U/L — SIGNIFICANT CHANGE UP (ref 40–120)
ALT FLD-CCNC: 18 U/L — SIGNIFICANT CHANGE UP (ref 10–45)
ALT FLD-CCNC: 21 U/L — SIGNIFICANT CHANGE UP (ref 10–45)
ANION GAP SERPL CALC-SCNC: 13 MMOL/L — SIGNIFICANT CHANGE UP (ref 5–17)
ANION GAP SERPL CALC-SCNC: 15 MMOL/L — SIGNIFICANT CHANGE UP (ref 5–17)
ANISOCYTOSIS BLD QL: SLIGHT — SIGNIFICANT CHANGE UP
APTT BLD: 24.7 SEC — LOW (ref 27.5–35.5)
AST SERPL-CCNC: 18 U/L — SIGNIFICANT CHANGE UP (ref 10–40)
AST SERPL-CCNC: 22 U/L — SIGNIFICANT CHANGE UP (ref 10–40)
BASOPHILS # BLD AUTO: 0 K/UL — SIGNIFICANT CHANGE UP (ref 0–0.2)
BASOPHILS NFR BLD AUTO: 0 % — SIGNIFICANT CHANGE UP (ref 0–2)
BILIRUB SERPL-MCNC: 0.5 MG/DL — SIGNIFICANT CHANGE UP (ref 0.2–1.2)
BILIRUB SERPL-MCNC: 0.6 MG/DL — SIGNIFICANT CHANGE UP (ref 0.2–1.2)
BUN SERPL-MCNC: 14 MG/DL — SIGNIFICANT CHANGE UP (ref 7–23)
BUN SERPL-MCNC: 15 MG/DL — SIGNIFICANT CHANGE UP (ref 7–23)
CALCIUM SERPL-MCNC: 10.5 MG/DL — SIGNIFICANT CHANGE UP (ref 8.4–10.5)
CALCIUM SERPL-MCNC: 9.7 MG/DL — SIGNIFICANT CHANGE UP (ref 8.4–10.5)
CHLORIDE SERPL-SCNC: 100 MMOL/L — SIGNIFICANT CHANGE UP (ref 96–108)
CHLORIDE SERPL-SCNC: 97 MMOL/L — SIGNIFICANT CHANGE UP (ref 96–108)
CK MB BLD-MCNC: 1.1 % — SIGNIFICANT CHANGE UP (ref 0–3.5)
CK MB BLD-MCNC: 1.1 % — SIGNIFICANT CHANGE UP (ref 0–3.5)
CK MB CFR SERPL CALC: 3.8 NG/ML — SIGNIFICANT CHANGE UP (ref 0–6.7)
CK MB CFR SERPL CALC: 4.6 NG/ML — SIGNIFICANT CHANGE UP (ref 0–6.7)
CK SERPL-CCNC: 356 U/L — HIGH (ref 30–200)
CK SERPL-CCNC: 438 U/L — HIGH (ref 30–200)
CO2 SERPL-SCNC: 21 MMOL/L — LOW (ref 22–31)
CO2 SERPL-SCNC: 23 MMOL/L — SIGNIFICANT CHANGE UP (ref 22–31)
CREAT SERPL-MCNC: 1.16 MG/DL — SIGNIFICANT CHANGE UP (ref 0.5–1.3)
CREAT SERPL-MCNC: 1.27 MG/DL — SIGNIFICANT CHANGE UP (ref 0.5–1.3)
ELLIPTOCYTES BLD QL SMEAR: SLIGHT — SIGNIFICANT CHANGE UP
EOSINOPHIL # BLD AUTO: 0 K/UL — SIGNIFICANT CHANGE UP (ref 0–0.5)
EOSINOPHIL NFR BLD AUTO: 0 % — SIGNIFICANT CHANGE UP (ref 0–6)
GLUCOSE SERPL-MCNC: 122 MG/DL — HIGH (ref 70–99)
GLUCOSE SERPL-MCNC: 124 MG/DL — HIGH (ref 70–99)
HCT VFR BLD CALC: 42.1 % — SIGNIFICANT CHANGE UP (ref 39–50)
HGB BLD-MCNC: 13 G/DL — SIGNIFICANT CHANGE UP (ref 13–17)
INR BLD: 1.07 RATIO — SIGNIFICANT CHANGE UP (ref 0.88–1.16)
LACTATE SERPL-SCNC: 1.1 MMOL/L — SIGNIFICANT CHANGE UP (ref 0.7–2)
LIDOCAIN IGE QN: 35 U/L — SIGNIFICANT CHANGE UP (ref 7–60)
LYMPHOCYTES # BLD AUTO: 0.69 K/UL — LOW (ref 1–3.3)
LYMPHOCYTES # BLD AUTO: 11.3 % — LOW (ref 13–44)
MANUAL SMEAR VERIFICATION: SIGNIFICANT CHANGE UP
MCHC RBC-ENTMCNC: 21.8 PG — LOW (ref 27–34)
MCHC RBC-ENTMCNC: 30.9 GM/DL — LOW (ref 32–36)
MCV RBC AUTO: 70.8 FL — LOW (ref 80–100)
MICROCYTES BLD QL: SLIGHT — SIGNIFICANT CHANGE UP
MONOCYTES # BLD AUTO: 0.16 K/UL — SIGNIFICANT CHANGE UP (ref 0–0.9)
MONOCYTES NFR BLD AUTO: 2.6 % — SIGNIFICANT CHANGE UP (ref 2–14)
NEUTROPHILS # BLD AUTO: 5.27 K/UL — SIGNIFICANT CHANGE UP (ref 1.8–7.4)
NEUTROPHILS NFR BLD AUTO: 86.1 % — HIGH (ref 43–77)
NT-PROBNP SERPL-SCNC: 265 PG/ML — SIGNIFICANT CHANGE UP (ref 0–300)
PLAT MORPH BLD: NORMAL — SIGNIFICANT CHANGE UP
PLATELET # BLD AUTO: 297 K/UL — SIGNIFICANT CHANGE UP (ref 150–400)
POIKILOCYTOSIS BLD QL AUTO: SLIGHT — SIGNIFICANT CHANGE UP
POTASSIUM SERPL-MCNC: 3.5 MMOL/L — SIGNIFICANT CHANGE UP (ref 3.5–5.3)
POTASSIUM SERPL-MCNC: 4.3 MMOL/L — SIGNIFICANT CHANGE UP (ref 3.5–5.3)
POTASSIUM SERPL-SCNC: 3.5 MMOL/L — SIGNIFICANT CHANGE UP (ref 3.5–5.3)
POTASSIUM SERPL-SCNC: 4.3 MMOL/L — SIGNIFICANT CHANGE UP (ref 3.5–5.3)
PROT SERPL-MCNC: 7.3 G/DL — SIGNIFICANT CHANGE UP (ref 6–8.3)
PROT SERPL-MCNC: 8.5 G/DL — HIGH (ref 6–8.3)
PROTHROM AB SERPL-ACNC: 12.8 SEC — SIGNIFICANT CHANGE UP (ref 10.6–13.6)
RBC # BLD: 5.95 M/UL — HIGH (ref 4.2–5.8)
RBC # FLD: 15.9 % — HIGH (ref 10.3–14.5)
RBC BLD AUTO: ABNORMAL
SARS-COV-2 RNA SPEC QL NAA+PROBE: SIGNIFICANT CHANGE UP
SODIUM SERPL-SCNC: 134 MMOL/L — LOW (ref 135–145)
SODIUM SERPL-SCNC: 135 MMOL/L — SIGNIFICANT CHANGE UP (ref 135–145)
TROPONIN T, HIGH SENSITIVITY RESULT: 13 NG/L — SIGNIFICANT CHANGE UP (ref 0–51)
TROPONIN T, HIGH SENSITIVITY RESULT: 14 NG/L — SIGNIFICANT CHANGE UP (ref 0–51)
WBC # BLD: 6.12 K/UL — SIGNIFICANT CHANGE UP (ref 3.8–10.5)
WBC # FLD AUTO: 6.12 K/UL — SIGNIFICANT CHANGE UP (ref 3.8–10.5)

## 2021-07-31 PROCEDURE — 93010 ELECTROCARDIOGRAM REPORT: CPT

## 2021-07-31 PROCEDURE — 99291 CRITICAL CARE FIRST HOUR: CPT

## 2021-07-31 PROCEDURE — 74018 RADEX ABDOMEN 1 VIEW: CPT | Mod: 26

## 2021-07-31 PROCEDURE — 99222 1ST HOSP IP/OBS MODERATE 55: CPT

## 2021-07-31 PROCEDURE — 99285 EMERGENCY DEPT VISIT HI MDM: CPT | Mod: 25

## 2021-07-31 PROCEDURE — 71045 X-RAY EXAM CHEST 1 VIEW: CPT | Mod: 26

## 2021-07-31 PROCEDURE — 93458 L HRT ARTERY/VENTRICLE ANGIO: CPT | Mod: 26

## 2021-07-31 PROCEDURE — 93306 TTE W/DOPPLER COMPLETE: CPT | Mod: 26

## 2021-07-31 RX ORDER — ACETAMINOPHEN 500 MG
1000 TABLET ORAL ONCE
Refills: 0 | Status: COMPLETED | OUTPATIENT
Start: 2021-07-31 | End: 2021-07-31

## 2021-07-31 RX ORDER — TICAGRELOR 90 MG/1
180 TABLET ORAL ONCE
Refills: 0 | Status: COMPLETED | OUTPATIENT
Start: 2021-07-31 | End: 2021-07-31

## 2021-07-31 RX ORDER — POTASSIUM CHLORIDE 20 MEQ
20 PACKET (EA) ORAL ONCE
Refills: 0 | Status: COMPLETED | OUTPATIENT
Start: 2021-07-31 | End: 2021-08-01

## 2021-07-31 RX ORDER — HEPARIN SODIUM 5000 [USP'U]/ML
INJECTION INTRAVENOUS; SUBCUTANEOUS
Qty: 25000 | Refills: 0 | Status: DISCONTINUED | OUTPATIENT
Start: 2021-07-31 | End: 2021-07-31

## 2021-07-31 RX ORDER — NITROGLYCERIN 6.5 MG
16.67 CAPSULE, EXTENDED RELEASE ORAL
Qty: 50 | Refills: 0 | Status: DISCONTINUED | OUTPATIENT
Start: 2021-07-31 | End: 2021-07-31

## 2021-07-31 RX ORDER — HEPARIN SODIUM 5000 [USP'U]/ML
4000 INJECTION INTRAVENOUS; SUBCUTANEOUS ONCE
Refills: 0 | Status: COMPLETED | OUTPATIENT
Start: 2021-07-31 | End: 2021-07-31

## 2021-07-31 RX ORDER — ONDANSETRON 8 MG/1
4 TABLET, FILM COATED ORAL ONCE
Refills: 0 | Status: DISCONTINUED | OUTPATIENT
Start: 2021-07-31 | End: 2021-07-31

## 2021-07-31 RX ORDER — CARVEDILOL PHOSPHATE 80 MG/1
1 CAPSULE, EXTENDED RELEASE ORAL
Qty: 0 | Refills: 0 | DISCHARGE

## 2021-07-31 RX ORDER — HYDRALAZINE HCL 50 MG
10 TABLET ORAL ONCE
Refills: 0 | Status: DISCONTINUED | OUTPATIENT
Start: 2021-07-31 | End: 2021-07-31

## 2021-07-31 RX ORDER — NITROGLYCERIN 6.5 MG
1 CAPSULE, EXTENDED RELEASE ORAL
Qty: 50 | Refills: 0 | Status: DISCONTINUED | OUTPATIENT
Start: 2021-07-31 | End: 2021-07-31

## 2021-07-31 RX ORDER — ASPIRIN/CALCIUM CARB/MAGNESIUM 324 MG
324 TABLET ORAL ONCE
Refills: 0 | Status: COMPLETED | OUTPATIENT
Start: 2021-07-31 | End: 2021-07-31

## 2021-07-31 RX ORDER — CARVEDILOL PHOSPHATE 80 MG/1
12.5 CAPSULE, EXTENDED RELEASE ORAL EVERY 12 HOURS
Refills: 0 | Status: DISCONTINUED | OUTPATIENT
Start: 2021-08-01 | End: 2021-08-01

## 2021-07-31 RX ORDER — HEPARIN SODIUM 5000 [USP'U]/ML
4000 INJECTION INTRAVENOUS; SUBCUTANEOUS EVERY 6 HOURS
Refills: 0 | Status: DISCONTINUED | OUTPATIENT
Start: 2021-07-31 | End: 2021-07-31

## 2021-07-31 RX ORDER — ASPIRIN/CALCIUM CARB/MAGNESIUM 324 MG
162 TABLET ORAL ONCE
Refills: 0 | Status: DISCONTINUED | OUTPATIENT
Start: 2021-07-31 | End: 2021-07-31

## 2021-07-31 RX ORDER — LEVOTHYROXINE SODIUM 125 MCG
50 TABLET ORAL DAILY
Refills: 0 | Status: DISCONTINUED | OUTPATIENT
Start: 2021-08-01 | End: 2021-08-01

## 2021-07-31 RX ORDER — PANTOPRAZOLE SODIUM 20 MG/1
40 TABLET, DELAYED RELEASE ORAL ONCE
Refills: 0 | Status: COMPLETED | OUTPATIENT
Start: 2021-07-31 | End: 2021-07-31

## 2021-07-31 RX ORDER — AMLODIPINE BESYLATE 2.5 MG/1
1 TABLET ORAL
Qty: 0 | Refills: 0 | DISCHARGE

## 2021-07-31 RX ORDER — HYDRALAZINE HCL 50 MG
25 TABLET ORAL ONCE
Refills: 0 | Status: COMPLETED | OUTPATIENT
Start: 2021-07-31 | End: 2021-07-31

## 2021-07-31 RX ORDER — DOXAZOSIN MESYLATE 4 MG
2 TABLET ORAL AT BEDTIME
Refills: 0 | Status: DISCONTINUED | OUTPATIENT
Start: 2021-08-01 | End: 2021-08-01

## 2021-07-31 RX ADMIN — Medication 1000 MILLIGRAM(S): at 22:10

## 2021-07-31 RX ADMIN — HEPARIN SODIUM 4000 UNIT(S): 5000 INJECTION INTRAVENOUS; SUBCUTANEOUS at 11:30

## 2021-07-31 RX ADMIN — PANTOPRAZOLE SODIUM 40 MILLIGRAM(S): 20 TABLET, DELAYED RELEASE ORAL at 16:02

## 2021-07-31 RX ADMIN — Medication 25 MILLIGRAM(S): at 21:54

## 2021-07-31 RX ADMIN — Medication 0.3 MICROGRAM(S)/MIN: at 14:02

## 2021-07-31 RX ADMIN — Medication 324 MILLIGRAM(S): at 11:06

## 2021-07-31 RX ADMIN — TICAGRELOR 180 MILLIGRAM(S): 90 TABLET ORAL at 11:05

## 2021-07-31 RX ADMIN — Medication 400 MILLIGRAM(S): at 21:54

## 2021-07-31 NOTE — PROGRESS NOTE ADULT - SUBJECTIVE AND OBJECTIVE BOX
====================  CCU MIDNIGHT ROUNDS  ====================    REUBEN BATISTA  [id number]  Patient is a 50y old  Male who presents with a chief complaint of STEMI (31 Jul 2021 13:15)      ====================  SUMMARY: 50 year old male with history of HTN and hypothyroidism presenting with chest pain x 1d. States having initially mild upper abdominal pain yesterday evening that worsened @ 2AM, waking him from sleep, with associated nausea and vomiting. Once presenting to ED was found to have JASON in V2-V3, I and AvL, concerning for anterolateral STEMI, slightly hypertensive to 160 SBP but vitals otherwise wnl. Cardiology consulted and decision made to emergently take patient to cath lab s/p ASA/brillinta load. Now admitted to CCU following unremarkable cath for hypertension to 200s SBP.    Had negative stress test 2-3 year ago, no other known cardiac history. Denies tobacco smoking or significant EtOH history. Cardiologist: Dr. Palmer ([facility]). States he did not take his home medications yesterday evening or this morning.    7/31: Weaned off Nitro gtt w/ PO Anti-HTN and home regimen restarted     ====================  VITALS (Last 12 hrs):  ====================    T(C): 36.8 (07-31-21 @ 16:00), Max: 36.8 (07-31-21 @ 16:00)  T(F): 98.2 (07-31-21 @ 16:00), Max: 98.2 (07-31-21 @ 16:00)  HR: 72 (07-31-21 @ 18:45) (66 - 96)  BP: 149/72 (07-31-21 @ 18:45) (117/73 - 190/99)  BP(mean): 100 (07-31-21 @ 18:45) (76 - 138)  ABP: --  ABP(mean): --  RR: 35 (07-31-21 @ 18:45) (8 - 35)  SpO2: 95% (07-31-21 @ 18:45) (95% - 100%)  Wt(kg): --  CVP(mm Hg): --  CVP(cm H2O): --  CO: --  CI: --  PA: --  PA(mean): --  PCWP: --  SVR: --  PVR: --    I&O's Summary    31 Jul 2021 07:01  -  31 Jul 2021 22:15  --------------------------------------------------------  IN: 290 mL / OUT: 450 mL / NET: -160 mL            ====================  NEW LABS:  ====================                          13.0   6.12  )-----------( 297      ( 31 Jul 2021 10:56 )             42.1     07-31    134<L>  |  100  |  15  ----------------------------<  124<H>  3.5   |  21<L>  |  1.16    Ca    9.7      31 Jul 2021 15:20    TPro  7.3  /  Alb  4.4  /  TBili  0.5  /  DBili  x   /  AST  18  /  ALT  18  /  AlkPhos  69  07-31    PT/INR - ( 31 Jul 2021 10:56 )   PT: 12.8 sec;   INR: 1.07 ratio         PTT - ( 31 Jul 2021 10:56 )  PTT:24.7 sec  Creatine Kinase, Serum: 356 U/L *H* (07-31-21 @ 15:20)  Creatine Kinase, Serum: 438 U/L *H* (07-31-21 @ 10:56)    CKMB Units: 3.8 ng/mL (07-31 @ 15:20)  CKMB Units: 4.6 ng/mL (07-31 @ 10:56)          ====================  PLAN:  ====================  #neuro  -AOx4  -continue to monitor per ICU protocol     #respiratory  -O2 sat wnl on RA, cont to maintain spO2 >92%     #cardiovascular  HTN Urgency w/ demand ischemia (Type 2)  - JASON in anterolateral leads in setting of hypertension to 200s SBP -> C 7/31 revealing clean cors   - s/p labetalol and hydral IV pushes prior to CICU admission  - in setting of clean cors would hold off on continuing medical/pharmacologic therapy for ACS   - placed on Nitro gtt in setting of   - Nitro weaned down and off after 25 Hydral azine PO dosing   - restart home anti-htn regimen as pt declares he had not taken these medications x past 2 days: Coreg 12.5 BID and Losartan 50 daily   - ** Goal SBP <160 until 7/31 22:00, then goal is to achieve normotension   - TTE 7/31: EF 60% w/ concentric LVH and normal RVSF     #renal  BPH  - On cardura @ home -> continue     #GI  Epigastric Pain   - LFTs wnl  - last BM this AM and +flatus, no hx of abdominal surgeries, low suspicion for bowel obstruction- no n/v/d, hematemesis, hematochezia   - f/u RUQ US, but presentation more c/w pain realted to HTN urgency as resolution achieved w/ better BP control     Diet: DASH/TLC     #heme  - No active issues at this time     VTE ppx  - would start on SQ Heparin/Lovenox in AM     #endocrine  -  -no hx of DM, f/u TSH, A1c and lipid panel     #ID  -WBC 6  -no clinical evidence for infection at this time, would continue to monitor temp, CBC and refrain from infectious w/u or antimicrobial therapy at this time       This patient required 35 mins of critical care time       Jonah Silva PA-C CICU

## 2021-07-31 NOTE — ED PROVIDER NOTE - PROGRESS NOTE DETAILS
Robson, PGY3 - Cards consulted, evaluating pt at bedside Robson, PGY3 - Cards to take pt to cath, requesting Brilinta & Heparin load

## 2021-07-31 NOTE — ED PROVIDER NOTE - ATTENDING CONTRIBUTION TO CARE
49y/o m with pmhx HTN presents for epigastric abd / chest pain that began at around 2 am this morning. no fever or cough. no chills. no back pain. no heart palp. resolved briefly twice after belching. 51y/o m with pmhx HTN presents for epigastric abd / chest pain that began at around 2 am this morning. no fever or cough. no chills. no back pain. no heart palp. resolved briefly twice after belching. pain is currently 12/10 in severity. no pain radiating to back no tearing pain. no vomiting no diarrhea. last meal was last night at around 9:30 pm. first time feeling this pain.  had a stress test approx 2 yrs ago and was normal. took gas x with min relief.   Gen.  no resp distress, but appears moderately discomfort from pain  HEENT:  perrl eomi  Lungs:  b/l bs, no crackles. no wheezing  CVS: S1S2   Abd;  soft non tender no distention  Ext: trace lower ext pitting edema no erythema. no ttp.  Neuro: aaox3 no focal deficits  MSK: strength 5/5 b/l upper and lower ext.

## 2021-07-31 NOTE — ED PROVIDER NOTE - CLINICAL SUMMARY MEDICAL DECISION MAKING FREE TEXT BOX
ATTG: : chest pain / epigastric pain with abml ekg, cardio called immediately to bedside, check labs, check xray, check cardiac work up, re eval for dispo.   Cardiology came to bedside shortly after an evaluated patient, recommend immediate cardiac cath. patient taken to cath lab.

## 2021-07-31 NOTE — H&P ADULT - NSHPREVIEWOFSYSTEMS_GEN_ALL_CORE
Gen: No fever, no chills  CV: +chest pain, no palpitations  HEENT: No sore throat, no hoarseness  Skin: No rash, no color changes  Resp: No SOB, no cough  Endo: No sensitivity to heat or cold, no appetite changes  GI: No constipation, no diarrhea, +nausea, +vomiting  Msk: No back pain, no LE swelling, no extremity pain  : No dysuria, no increased frequency  Neuro: No LOC, no weakness, no numbness

## 2021-07-31 NOTE — H&P ADULT - NSHPPHYSICALEXAM_GEN_ALL_CORE
Gen - NAD; laying comfortably; A+Ox3   HEENT - NCAT, EOMI  Neck - supple  Resp - CTAB  CV -  RRR  Abd - soft, mildly distended, tender to palpation in epigastric region, +BS; no guarding or rebound  MSK - 5/5 strength and FROM b/l UE and LE  Extrem - no LE edema/erythema/tenderness; 2+ radial pulses b/l  Neuro - no focal motor or sensation deficits

## 2021-07-31 NOTE — CONSULT NOTE ADULT - ASSESSMENT
50M w/ HTN and hypothyroid here with lower chest pain, concerning for STEMI in anterior-lateral distribution. Differential includes LVH. However, given typical symptoms, will take to cath lab for possible primary PCI.    Recs:  - load hep, Brillinta, ASA  - will go for cath  - rest of recs pending findings on cath    Jakob Melgar MD  Cardiology Fellow PGY-5  [facility]    Notes are not final until signed by attending  For all consults and questions:  [email or web address]   Login: [email or web address]

## 2021-07-31 NOTE — CONSULT NOTE ADULT - SUBJECTIVE AND OBJECTIVE BOX
Initial ECG: 10:28  Called by ED: 10:40  Saw patient at bedside: 10:43  Called Cath Attending:: [id number]    Patient seen and evaluated at bedside    Reason for consult: STEMI    HPI:  50M w/ HTN and hypothyroid here w/ 10/10 dull lower chest/upper abdominal pain since 2am today at rest, worse with exertion. He has no cardiac history, but reports a negative stress many years ago. Currently, ECG shows sinus rhythm in 70s with JASON in V2-V3, I and AVL. He is having active pain, but denies SOB, palpitations, orthopnea or PND.    PMHx:   HTN (hypertension)    Hypothyroidism        PSHx:   No significant past surgical history    S/P excision of lipoma        Home Meds:    Current Meds:   aspirin  chewable 162 milliGRAM(s) Oral once  heparin   Injectable 4000 Unit(s) IV Push once  heparin   Injectable 4000 Unit(s) IV Push every 6 hours PRN  heparin  Infusion.  Unit(s)/Hr IV Continuous <Continuous>  ticagrelor 180 milliGRAM(s) Oral Once      Allergies:  penicillins (Unknown)  shellfish (Swelling)      FAMILY HISTORY:  Family history of hypertension (Mother)    Family history of prostate cancer (Father)    Family history of diabetes mellitus  dad, insulin dependent    Social History: NA    Review of Systems:  REVIEW OF SYSTEMS:  CONSTITUTIONAL: No weakness, fevers or chills  EYES/ENT: No visual changes;  No dysphagia  NECK: No pain or stiffness  RESPIRATORY: No cough, wheezing, hemoptysis; No shortness of breath  GASTROINTESTINAL: No abdominal or epigastric pain. No nausea, vomiting, or hematemesis; No diarrhea or constipation. No melena or hematochezia.  BACK: No back pain  GENITOURINARY: No dysuria, frequency or hematuria  NEUROLOGICAL: No numbness or weakness  SKIN: No itching, burning, rashes, or lesions   All other review of systems is negative unless indicated above.    Physical Exam:  T(F): 97.8 (07-31), Max: 97.8 (07-31)  HR: 81 (07-31) (81 - 81)  BP: 167/100 (07-31) (167/100 - 167/100)  RR: 18 (07-31)  SpO2: 98% (07-31)  GENERAL: in acute distress  HEAD:  Atraumatic, Normocephalic  ENT: EOMI, PERRLA, conjunctiva and sclera clear, Neck supple, No JVD, moist mucosa  CHEST/LUNG: Clear to auscultation bilaterally; No wheeze, equal breath sounds bilaterally   BACK: No spinal tenderness  HEART: Regular rate and rhythm; No murmurs, rubs, or gallops  ABDOMEN: Soft, Nontender, Nondistended; Bowel sounds present  EXTREMITIES:  No clubbing, cyanosis, or edema  PSYCH: Nl behavior, nl affect  NEUROLOGY: AAOx3, non-focal, cranial nerves intact  SKIN: Normal color, No rashes or lesions        Labs: Personally reviewed

## 2021-07-31 NOTE — H&P ADULT - HISTORY OF PRESENT ILLNESS
HPI: 50 year old male with history of HTN and hypothyroidism presenting with chest pain x 1d. States having spontaneous sudden onset of lower chest/upper abdominal pain at ~2 am, waking him from sleep, with associated nausea and vomiting. Once presenting to ED was found to have JASON in V2-V3, I and AvL, concerning for anterolateral STEMI, slightly hypertensive to 160 SBP but vitals otherwise wnl. Cardiology consulted and decision made to emergently take patient to cath lab s/p ASA/brillinta load. Now admitted to CCU following PCI with stent to       for close hemodynamic monitoring.    Had negative stress test 2-3 year ago, no other known cardiac history.          HPI: 50 year old male with history of HTN and hypothyroidism presenting with chest pain x 1d. States having initially mild upper abdominal pain yesterday evening that worsened @ 2AM, waking him from sleep, with associated nausea and vomiting. Once presenting to ED was found to have JASON in V2-V3, I and AvL, concerning for anterolateral STEMI, slightly hypertensive to 160 SBP but vitals otherwise wnl. Cardiology consulted and decision made to emergently take patient to cath lab s/p ASA/brillinta load. Now admitted to CCU following unremarkable cath for hypertension to 200s SBP.    Had negative stress test 2-3 year ago, no other known cardiac history. Denies tobacco smoking or significant EtOH history.          HPI: 50 year old male with history of HTN and hypothyroidism presenting with chest pain x 1d. States having initially mild upper abdominal pain yesterday evening that worsened @ 2AM, waking him from sleep, with associated nausea and vomiting. Once presenting to ED was found to have JASON in V2-V3, I and AvL, concerning for anterolateral STEMI, slightly hypertensive to 160 SBP but vitals otherwise wnl. Cardiology consulted and decision made to emergently take patient to cath lab s/p ASA/brillinta load. Now admitted to CCU following unremarkable cath for hypertension to 200s SBP.    Had negative stress test 2-3 year ago, no other known cardiac history. Denies tobacco smoking or significant EtOH history. Cardiologist: Dr. Palmer ([facility]) HPI: 50 year old male with history of HTN and hypothyroidism presenting with chest pain x 1d. States having initially mild upper abdominal pain yesterday evening that worsened @ 2AM, waking him from sleep, with associated nausea and vomiting. Once presenting to ED was found to have JASON in V2-V3, I and AvL, concerning for anterolateral STEMI, slightly hypertensive to 160 SBP but vitals otherwise wnl. Cardiology consulted and decision made to emergently take patient to cath lab s/p ASA/brillinta load. Now admitted to CCU following unremarkable cath for hypertension to 200s SBP.    Had negative stress test 2-3 year ago, no other known cardiac history. Denies tobacco smoking or significant EtOH history. Cardiologist: Dr. Palmer ([facility]). States he did not take his home medications yesterday evening or this morning.

## 2021-07-31 NOTE — CHART NOTE - NSCHARTNOTEFT_GEN_A_CORE
Padua Prediction Score for VTE Risk within 24hours of admission:    Active malignancy:                                                    [  ] YES +3, [ x ] NO   Previous VTE (Excluding Superficial Vein Thrombosis): [  ] YES +3, [ x ] NO  Reduced mobility:                                                     [  ] YES +3, [ x ] NO  Already known thrombophilic condition:                     [  ] YES +3, [ x ] NO  Recent (</=1 month trauma and/or surgery):             [  ] YES +2, [ x ] NO  Elderly age (>/=70):                                                  [ x ] YES +1, [  ] NO  Heart and/or Respiratory Failure:                               [  ] YES +1, [ x ] NO   Acute MI and/or ischemic CVA:                                  [  ] YES +1, [ x ] NO   Acute infection and/or rheumatologic disorder:          [  ] YES +1, [ x ] NO   BMI>/= 30:                                                              [  ] YES +1, [ x ] NO   Ongoing hormonal treatment:                                   [  ] YES +1, [  x] NO    Total Score: [ 1 ]  points    [ x ] Padua Score <  3: Low Risk of VTE         - Chemical Thromboprophylaxis should be considered on case-by-case basis  [  ] Padua Score >/= 4: High Risk of VTE         - Chemical Thromboprophylaxis is recommended for nonpregnant patients without contraindications (Major bleeding, thrombocytopenia) who are >/=  18 years of age                         VTE Prophylaxis Recommendations:  Mechanical Pneumatic Compression Devices                                [  ]  Yes,  [  ] No, Contraindicated    Chemical VTE Prophylaxis (Heparin/ Lovenox/ Fondaparinux)        [ x  ] Yes,  [  ] No              [ ] Contraindicated, because _____              [ ] Already receiving Systemic Anticoagulation Padua Prediction Score for VTE Risk within 24hours of admission:    Active malignancy:                                                    [  ] YES +3, [ x ] NO   Previous VTE (Excluding Superficial Vein Thrombosis): [  ] YES +3, [ x ] NO  Reduced mobility:                                                     [  ] YES +3, [ x ] NO  Already known thrombophilic condition:                     [  ] YES +3, [ x ] NO  Recent (</=1 month trauma and/or surgery):             [  ] YES +2, [ x ] NO  Elderly age (>/=70):                                                  [ x ] YES +1, [  ] NO  Heart and/or Respiratory Failure:                               [  ] YES +1, [ x ] NO   Acute MI and/or ischemic CVA:                                  [  ] YES +1, [ x ] NO   Acute infection and/or rheumatologic disorder:          [  ] YES +1, [ x ] NO   BMI>/= 30:                                                              [ x ] YES +1, [ ] NO   Ongoing hormonal treatment:                                   [  ] YES +1, [  x] NO    Total Score: [ 1 ]  points    [ x ] Padua Score <  3: Low Risk of VTE         - Chemical Thromboprophylaxis should be considered on case-by-case basis  [  ] Padua Score >/= 4: High Risk of VTE         - Chemical Thromboprophylaxis is recommended for nonpregnant patients without contraindications (Major bleeding, thrombocytopenia) who are >/=  18 years of age                         VTE Prophylaxis Recommendations:  Mechanical Pneumatic Compression Devices                                [  ]  Yes,  [  ] No, Contraindicated    Chemical VTE Prophylaxis (Heparin/ Lovenox/ Fondaparinux)        [ x  ] Yes,  [  ] No              [ ] Contraindicated, because _____              [ ] Already receiving Systemic Anticoagulation

## 2021-07-31 NOTE — ED PROVIDER NOTE - OBJECTIVE STATEMENT
50M PMH HTN p/w CP x hours. Woke from sleep at 2am. A/w n/v x2. Pain went away for only 15min. 50M PMH HTN p/w CP x hours. Woke from sleep at 2am. A/w n/v x2. Pain went away for only 15min. Last stress 2-3 years ago, no intervention.

## 2021-07-31 NOTE — H&P ADULT - ATTENDING COMMENTS
[phone]
50yogentleman htn with atypical chest pain. Normal cath and normal ECHO . ECG double standard and is really only LVH.   GI etiology of symptoms. Patient may be transferred out of ICU.

## 2021-07-31 NOTE — H&P ADULT - NSHPLABSRESULTS_GEN_ALL_CORE
13.0   6.12  )-----------( 297      ( 31 Jul 2021 10:56 )             42.1     07-31    135  |  97  |  14  ----------------------------<  122<H>  4.3   |  23  |  1.27    Ca    10.5      31 Jul 2021 10:56    TPro  8.5<H>  /  Alb  5.0  /  TBili  0.6  /  DBili  x   /  AST  22  /  ALT  21  /  AlkPhos  79  07-31    PT/INR - ( 31 Jul 2021 10:56 )   PT: 12.8 sec;   INR: 1.07 ratio         PTT - ( 31 Jul 2021 10:56 )  PTT:24.7 sec

## 2021-07-31 NOTE — ED ADULT NURSE NOTE - OBJECTIVE STATEMENT
51 y/o male hx HTN presents to the ED from home c/o epigastric pain/CP that woke him up from sleep at 2am. Pt states associated n/v, feels as though abd is distended. Denies fever, chills, n/v, weakness, abd pain, diarrhea/constipation, numbness/tingling, urinary s/s. Pt A&Ox3, in no respiratory distress, ST elevations noted in various leads, cardiology called, pt transported to cath lab with MD, RN, and EDT. Heparin 4000u bolus, ASA 324mg, and Brillinta 180mg administered prior to transfer. PT safety maintained, call bell within reach and bed in the lowest position.

## 2021-07-31 NOTE — H&P ADULT - ASSESSMENT
50 year old male with history of HTN and hypothyroidism presenting with anterolateral STEMI s/p PCI with stents to         #neuro  -    #respiratory      #cardiovascular      #renal      #GI      #heme      #endocrine      #ID       50 year old male with history of HTN and hypothyroidism presenting with chest pain    #neuro  -    #respiratory      #cardiovascular      #renal      #GI      #heme      #endocrine      #ID       50 year old male with history of HTN and hypothyroidism presenting with chest pain found to have JASON in lakeisha-lateral distribution admitted to CCU for hypertensive emergency s/p nonobstructive emergent cath.    #neuro  -AOx4  -continue to monitor closely    #respiratory  -O2 sat wnl on RA  -monitor pulse oximetry  -CXR pending    #cardiovascular  -      #renal      #GI      #heme      #endocrine      #ID       50 year old male with history of HTN and hypothyroidism presenting with chest pain found to have JASON in lakeisha-lateral distribution admitted to CCU for hypertensive emergency s/p nonobstructive emergent cath.    #neuro  -AOx4  -continue to monitor closely    #respiratory  -O2 sat wnl on RA  -monitor pulse oximetry  -CXR pending    #cardiovascular  -JASON in anterolateral leads in setting of hypertension to 200s SBP, s/p normal RHC, concern for hypertensive emergency in setting of missed home antihypertensives (yesterday evening/this AM)  -s/p labetalol and hydral IV pushes, now on nitro gtt, BP improving  -will need transition to PO/home meds  -tele monitoring  -f/u TTE    #renal  -Cr. 1.27  -Strict I&O's  -monitor electrolytes    #GI  -no active issues  -last BM this AM    #heme  -Hgb 13    #endocrine  -  -no hx of DM    #ID  -WBC 6  -no clinical evidence for infection at this time     50 year old male with history of HTN and hypothyroidism presenting with chest pain found to have JASON in lakeisha-lateral distribution admitted to CCU for hypertensive emergency s/p nonobstructive emergent cath.    #neuro  -AOx4  -continue to monitor closely    #respiratory  -O2 sat wnl on RA  -monitor pulse oximetry  -CXR pending    #cardiovascular  -JASON in anterolateral leads in setting of hypertension to 200s SBP, s/p normal RHC, concern for hypertensive emergency in setting of missed home antihypertensives (yesterday evening/this AM)  -s/p labetalol and hydral IV pushes, now on nitro gtt, BP improving  -will need transition to PO/home meds  -tele monitoring  -f/u TTE    #renal  -Cr. 1.27  -Strict I&O's  -monitor electrolytes    #GI  -endorses epigastric pain  -LFTs wnl  -last BM this AM, no hx of abdominal surgeries, low suspicion for bowel obstruction    #heme  -Hgb 13    #endocrine  -  -no hx of DM    #ID  -WBC 6  -no clinical evidence for infection at this time

## 2021-08-01 ENCOUNTER — TRANSCRIPTION ENCOUNTER (OUTPATIENT)
Age: 51
End: 2021-08-01

## 2021-08-01 VITALS — RESPIRATION RATE: 19 BRPM | OXYGEN SATURATION: 97 % | HEART RATE: 112 BPM

## 2021-08-01 LAB
A1C WITH ESTIMATED AVERAGE GLUCOSE RESULT: 6 % — HIGH (ref 4–5.6)
ALBUMIN SERPL ELPH-MCNC: 4.3 G/DL — SIGNIFICANT CHANGE UP (ref 3.3–5)
ALP SERPL-CCNC: 65 U/L — SIGNIFICANT CHANGE UP (ref 40–120)
ALT FLD-CCNC: 17 U/L — SIGNIFICANT CHANGE UP (ref 10–45)
ANION GAP SERPL CALC-SCNC: 14 MMOL/L — SIGNIFICANT CHANGE UP (ref 5–17)
AST SERPL-CCNC: 16 U/L — SIGNIFICANT CHANGE UP (ref 10–40)
BILIRUB SERPL-MCNC: 1.1 MG/DL — SIGNIFICANT CHANGE UP (ref 0.2–1.2)
BUN SERPL-MCNC: 17 MG/DL — SIGNIFICANT CHANGE UP (ref 7–23)
CALCIUM SERPL-MCNC: 9.5 MG/DL — SIGNIFICANT CHANGE UP (ref 8.4–10.5)
CHLORIDE SERPL-SCNC: 99 MMOL/L — SIGNIFICANT CHANGE UP (ref 96–108)
CHOLEST SERPL-MCNC: 142 MG/DL — SIGNIFICANT CHANGE UP
CO2 SERPL-SCNC: 22 MMOL/L — SIGNIFICANT CHANGE UP (ref 22–31)
COVID-19 NUCLEOCAPSID GAM AB INTERP: POSITIVE
COVID-19 NUCLEOCAPSID TOTAL GAM ANTIBODY RESULT: 4.17 INDEX — HIGH
COVID-19 SPIKE DOMAIN AB INTERP: POSITIVE
COVID-19 SPIKE DOMAIN ANTIBODY RESULT: >250 U/ML — HIGH
CREAT SERPL-MCNC: 1.38 MG/DL — HIGH (ref 0.5–1.3)
ESTIMATED AVERAGE GLUCOSE: 126 MG/DL — HIGH (ref 68–114)
GLUCOSE SERPL-MCNC: 124 MG/DL — HIGH (ref 70–99)
HCT VFR BLD CALC: 38.1 % — LOW (ref 39–50)
HDLC SERPL-MCNC: 49 MG/DL — SIGNIFICANT CHANGE UP
HGB BLD-MCNC: 12.2 G/DL — LOW (ref 13–17)
LIPID PNL WITH DIRECT LDL SERPL: 84 MG/DL — SIGNIFICANT CHANGE UP
MAGNESIUM SERPL-MCNC: 1.8 MG/DL — SIGNIFICANT CHANGE UP (ref 1.6–2.6)
MCHC RBC-ENTMCNC: 22.1 PG — LOW (ref 27–34)
MCHC RBC-ENTMCNC: 32 GM/DL — SIGNIFICANT CHANGE UP (ref 32–36)
MCV RBC AUTO: 69 FL — LOW (ref 80–100)
NON HDL CHOLESTEROL: 93 MG/DL — SIGNIFICANT CHANGE UP
NRBC # BLD: 0 /100 WBCS — SIGNIFICANT CHANGE UP (ref 0–0)
PHOSPHATE SERPL-MCNC: 3.4 MG/DL — SIGNIFICANT CHANGE UP (ref 2.5–4.5)
PLATELET # BLD AUTO: 248 K/UL — SIGNIFICANT CHANGE UP (ref 150–400)
POTASSIUM SERPL-MCNC: 3.7 MMOL/L — SIGNIFICANT CHANGE UP (ref 3.5–5.3)
POTASSIUM SERPL-SCNC: 3.7 MMOL/L — SIGNIFICANT CHANGE UP (ref 3.5–5.3)
PROT SERPL-MCNC: 7.2 G/DL — SIGNIFICANT CHANGE UP (ref 6–8.3)
RBC # BLD: 5.52 M/UL — SIGNIFICANT CHANGE UP (ref 4.2–5.8)
RBC # FLD: 15.3 % — HIGH (ref 10.3–14.5)
SARS-COV-2 IGG+IGM SERPL QL IA: 4.17 INDEX — HIGH
SARS-COV-2 IGG+IGM SERPL QL IA: >250 U/ML — HIGH
SARS-COV-2 IGG+IGM SERPL QL IA: POSITIVE
SARS-COV-2 IGG+IGM SERPL QL IA: POSITIVE
SODIUM SERPL-SCNC: 135 MMOL/L — SIGNIFICANT CHANGE UP (ref 135–145)
TRIGL SERPL-MCNC: 42 MG/DL — SIGNIFICANT CHANGE UP
TSH SERPL-MCNC: 5.8 UIU/ML — HIGH (ref 0.27–4.2)
WBC # BLD: 8.43 K/UL — SIGNIFICANT CHANGE UP (ref 3.8–10.5)
WBC # FLD AUTO: 8.43 K/UL — SIGNIFICANT CHANGE UP (ref 3.8–10.5)

## 2021-08-01 PROCEDURE — 85027 COMPLETE CBC AUTOMATED: CPT

## 2021-08-01 PROCEDURE — 83605 ASSAY OF LACTIC ACID: CPT

## 2021-08-01 PROCEDURE — 93306 TTE W/DOPPLER COMPLETE: CPT

## 2021-08-01 PROCEDURE — 80061 LIPID PANEL: CPT

## 2021-08-01 PROCEDURE — 80053 COMPREHEN METABOLIC PANEL: CPT

## 2021-08-01 PROCEDURE — C1769: CPT

## 2021-08-01 PROCEDURE — C1887: CPT

## 2021-08-01 PROCEDURE — 84443 ASSAY THYROID STIM HORMONE: CPT

## 2021-08-01 PROCEDURE — 84100 ASSAY OF PHOSPHORUS: CPT

## 2021-08-01 PROCEDURE — 86769 SARS-COV-2 COVID-19 ANTIBODY: CPT

## 2021-08-01 PROCEDURE — 93458 L HRT ARTERY/VENTRICLE ANGIO: CPT

## 2021-08-01 PROCEDURE — C1894: CPT

## 2021-08-01 PROCEDURE — 82553 CREATINE MB FRACTION: CPT

## 2021-08-01 PROCEDURE — 99285 EMERGENCY DEPT VISIT HI MDM: CPT

## 2021-08-01 PROCEDURE — 83735 ASSAY OF MAGNESIUM: CPT

## 2021-08-01 PROCEDURE — 85610 PROTHROMBIN TIME: CPT

## 2021-08-01 PROCEDURE — 84484 ASSAY OF TROPONIN QUANT: CPT

## 2021-08-01 PROCEDURE — 93005 ELECTROCARDIOGRAM TRACING: CPT

## 2021-08-01 PROCEDURE — 71045 X-RAY EXAM CHEST 1 VIEW: CPT

## 2021-08-01 PROCEDURE — 83036 HEMOGLOBIN GLYCOSYLATED A1C: CPT

## 2021-08-01 PROCEDURE — U0003: CPT

## 2021-08-01 PROCEDURE — 85730 THROMBOPLASTIN TIME PARTIAL: CPT

## 2021-08-01 PROCEDURE — 85025 COMPLETE CBC W/AUTO DIFF WBC: CPT

## 2021-08-01 PROCEDURE — 83880 ASSAY OF NATRIURETIC PEPTIDE: CPT

## 2021-08-01 PROCEDURE — 74018 RADEX ABDOMEN 1 VIEW: CPT

## 2021-08-01 PROCEDURE — 83690 ASSAY OF LIPASE: CPT

## 2021-08-01 PROCEDURE — 99238 HOSP IP/OBS DSCHRG MGMT 30/<: CPT

## 2021-08-01 PROCEDURE — 82550 ASSAY OF CK (CPK): CPT

## 2021-08-01 RX ORDER — ACETAMINOPHEN 500 MG
650 TABLET ORAL EVERY 6 HOURS
Refills: 0 | Status: DISCONTINUED | OUTPATIENT
Start: 2021-08-01 | End: 2021-08-01

## 2021-08-01 RX ORDER — CARVEDILOL PHOSPHATE 80 MG/1
1 CAPSULE, EXTENDED RELEASE ORAL
Qty: 60 | Refills: 3
Start: 2021-08-01 | End: 2021-11-28

## 2021-08-01 RX ORDER — HYDRALAZINE HCL 50 MG
15 TABLET ORAL ONCE
Refills: 0 | Status: COMPLETED | OUTPATIENT
Start: 2021-08-01 | End: 2021-08-01

## 2021-08-01 RX ORDER — ENOXAPARIN SODIUM 100 MG/ML
40 INJECTION SUBCUTANEOUS DAILY
Refills: 0 | Status: DISCONTINUED | OUTPATIENT
Start: 2021-08-01 | End: 2021-08-01

## 2021-08-01 RX ORDER — CARVEDILOL PHOSPHATE 80 MG/1
1 CAPSULE, EXTENDED RELEASE ORAL
Qty: 0 | Refills: 0 | DISCHARGE

## 2021-08-01 RX ORDER — LEVOTHYROXINE SODIUM 125 MCG
1 TABLET ORAL
Qty: 0 | Refills: 0 | DISCHARGE

## 2021-08-01 RX ORDER — LOSARTAN POTASSIUM 100 MG/1
50 TABLET, FILM COATED ORAL DAILY
Refills: 0 | Status: DISCONTINUED | OUTPATIENT
Start: 2021-08-01 | End: 2021-08-01

## 2021-08-01 RX ORDER — GABAPENTIN 400 MG/1
100 CAPSULE ORAL
Refills: 0 | Status: DISCONTINUED | OUTPATIENT
Start: 2021-08-01 | End: 2021-08-01

## 2021-08-01 RX ORDER — LOSARTAN POTASSIUM 100 MG/1
1 TABLET, FILM COATED ORAL
Qty: 30 | Refills: 3
Start: 2021-08-01 | End: 2021-11-28

## 2021-08-01 RX ORDER — HYDRALAZINE HCL 50 MG
30 TABLET ORAL EVERY 8 HOURS
Refills: 0 | Status: DISCONTINUED | OUTPATIENT
Start: 2021-08-01 | End: 2021-08-01

## 2021-08-01 RX ORDER — LOSARTAN POTASSIUM 100 MG/1
1 TABLET, FILM COATED ORAL
Qty: 0 | Refills: 0 | DISCHARGE

## 2021-08-01 RX ORDER — CHLORTHALIDONE 50 MG
1 TABLET ORAL
Qty: 90 | Refills: 0
Start: 2021-08-01 | End: 2021-10-29

## 2021-08-01 RX ORDER — HYDRALAZINE HCL 50 MG
10 TABLET ORAL ONCE
Refills: 0 | Status: COMPLETED | OUTPATIENT
Start: 2021-08-01 | End: 2021-08-01

## 2021-08-01 RX ORDER — MAGNESIUM SULFATE 500 MG/ML
1 VIAL (ML) INJECTION ONCE
Refills: 0 | Status: COMPLETED | OUTPATIENT
Start: 2021-08-01 | End: 2021-08-01

## 2021-08-01 RX ORDER — LEVOTHYROXINE SODIUM 125 MCG
1 TABLET ORAL
Qty: 0 | Refills: 0 | DISCHARGE
Start: 2021-08-01

## 2021-08-01 RX ORDER — POTASSIUM CHLORIDE 20 MEQ
20 PACKET (EA) ORAL ONCE
Refills: 0 | Status: COMPLETED | OUTPATIENT
Start: 2021-08-01 | End: 2021-08-01

## 2021-08-01 RX ADMIN — Medication 20 MILLIEQUIVALENT(S): at 05:09

## 2021-08-01 RX ADMIN — Medication 650 MILLIGRAM(S): at 06:20

## 2021-08-01 RX ADMIN — Medication 15 MILLIGRAM(S): at 02:48

## 2021-08-01 RX ADMIN — Medication 30 MILLILITER(S): at 06:36

## 2021-08-01 RX ADMIN — Medication 100 GRAM(S): at 06:21

## 2021-08-01 RX ADMIN — GABAPENTIN 100 MILLIGRAM(S): 400 CAPSULE ORAL at 06:28

## 2021-08-01 RX ADMIN — Medication 650 MILLIGRAM(S): at 06:36

## 2021-08-01 RX ADMIN — Medication 20 MILLIEQUIVALENT(S): at 06:20

## 2021-08-01 RX ADMIN — Medication 50 MICROGRAM(S): at 05:40

## 2021-08-01 RX ADMIN — CARVEDILOL PHOSPHATE 12.5 MILLIGRAM(S): 80 CAPSULE, EXTENDED RELEASE ORAL at 03:26

## 2021-08-01 RX ADMIN — Medication 10 MILLIGRAM(S): at 01:51

## 2021-08-01 RX ADMIN — LOSARTAN POTASSIUM 50 MILLIGRAM(S): 100 TABLET, FILM COATED ORAL at 06:20

## 2021-08-01 RX ADMIN — Medication 30 MILLIGRAM(S): at 05:09

## 2021-08-01 NOTE — DISCHARGE NOTE PROVIDER - NSDCCPCAREPLAN_GEN_ALL_CORE_FT
PRINCIPAL DISCHARGE DIAGNOSIS  Diagnosis: Chest pain  Assessment and Plan of Treatment: hypertensive crisis   Low salt diet  Activity as tolerated.  Take all medication as prescribed.  Follow up with your medical doctor for routine blood pressure monitoring at your next visit.  Notify your doctor if you have any of the following symptoms:   Dizziness, Lightheadedness, Blurry vision, Headache, Chest pain, Shortness of breath  No heavy lifting or pushing/pulling with procedure arm for 2 weeks. No driving for 2 days. You may shower 24 hours following the procedure but avoid baths/swimming for 1 week. Check your wrist site for bleeding and/or swelling daily following procedure and call your doctor immediately if it occurs or if you experience increased pain at the site. Follow up with your cardiologist in 1-2 weeks. You may call [facility] if you have any questions/concerns regarding your procedure [phone].

## 2021-08-01 NOTE — DISCHARGE NOTE PROVIDER - HOSPITAL COURSE
49 y/o M w/a pmhx of HTN and hypothyroidism admitted after having chest pain / epigastric pain, found to have STEMI in anterolateral leads s/p LHC w/ non obstructive coronaries. admitted for hypertensive crisis w/ /110, treated w/ nitro gtt. Home antihypertensive agents resumed and pt remains hemodynamically stable and CP free. Pt will be discharged to home w/ outpatient follow up.

## 2021-08-01 NOTE — PROGRESS NOTE ADULT - SUBJECTIVE AND OBJECTIVE BOX
CICU DAY NOTE     CICU DAY NOTE  Admission date: 7/31/21  Chief complaint/ Diagnosis: hypertensive crisis   HPI: A 50 year old male with history of HTN and hypothyroidism presenting with chest pain x 1d. States having initially mild upper abdominal pain yesterday evening that worsened @ 2AM, waking him from sleep, with associated nausea and vomiting. Once presenting to ED was found to have JASON in V2-V3, I and AvL, concerning for anterolateral STEMI, slightly hypertensive to 160 SBP but vitals otherwise wnl. Cardiology consulted and decision made to emergently take patient to cath lab s/p ASA/brillinta load. Now admitted to CCU following unremarkable cath for hypertension to 200s SBP.    Interval history: Uneventful overnight  Start oral antihypertensive agents    REVIEW OF SYSTEMS  Denies CP, Palpitation, SOB, Dyspnea [ x ] All other systems negative    MEDICATIONS  (STANDING)  carvedilol 12.5 milliGRAM(s) Oral every 12 hours  doxazosin 2 milliGRAM(s) Oral at bedtime  enoxaparin Injectable 40 milliGRAM(s) SubCutaneous daily  gabapentin 100 milliGRAM(s) Oral two times a day  levothyroxine 50 MICROGram(s) Oral daily  losartan 50 milliGRAM(s) Oral daily    MEDICATIONS  (PRN):  acetaminophen   Tablet .. 650 milliGRAM(s) Oral every 6 hours PRN Mild Pain (1 - 3), Moderate Pain (4 - 6)  bismuth subsalicylate Liquid 30 milliLiter(s) Oral two times a day PRN N/V/D, Gas, Bloating    PAST MEDICAL & SURGICAL HISTORY:  HTN (hypertension)  Hypothyroidism  S/P excision of lipoma    FAMILY HISTORY:  Family history of hypertension (Mother)  Family history of prostate cancer (Father)  Family history of diabetes mellitus    Allergy   penicillins (Unknown)  shellfish (Swelling)    ICU Vital Signs Last 24 Hrs  T(C): 37 (Max: 37.7)  HR: 112 (66 - 112)  BP: 138/69  (117/73 - 190/99)  RR: 19  (8 - 39)  SpO2: 97% (95% - 100%) on room air     I&O's Summary  IN: 995 mL / OUT: 850 mL / NET: 145 mL    PHYSICAL EXAM  Appearance: Normal, NAD  HEAD:  Atraumatic, Normocephalic  EYES: EOMI, PERRLA, conjunctiva and sclera clear  NECK: Supple, No JVD  CHEST/LUNG: Clear to auscultation bilaterally; No wheezing  HEART: Normal S1, S2. No murmurs, rubs, or gallops  ABDOMEN: + Bowel sounds, Soft, NT, ND   EXTREMITIES:  2+ Peripheral Pulses, No clubbing, cyanosis, or edema  NEUROLOGY: non-focal, aaox3  SKIN: No rashes or lesions    Interpretation of Telemetry: NSR                         12.2   8.43  )-----------( 248                  38.1     135  |  99  |  17  ----------------------------<  124<H>  3.7   |  22  |  1.38<1.16<1.27    Ca    9.5      Phos  3.4     Mg     1.8 (repleted)  TPro  7.2  /  Alb  4.3  /  TBili  1.1  /  DBili  x   /  AST  16  /  ALT  17  /  AlkPhos  65                     CICU DAY NOTE  Admission date: 7/31/21  Chief complaint/ Diagnosis: hypertensive crisis   HPI: A 50 year old male with history of HTN and hypothyroidism presenting with chest pain x 1d. States having initially mild upper abdominal pain yesterday evening that worsened @ 2AM, waking him from sleep, with associated nausea and vomiting. Once presenting to ED was found to have JASON in V2-V3, I and AvL, concerning for anterolateral STEMI, slightly hypertensive to 160 SBP but vitals otherwise wnl. Cardiology consulted and decision made to emergently take patient to cath lab s/p ASA/brillinta load. Now admitted to CCU following unremarkable cath for hypertension to 200s SBP.    Interval history: Uneventful overnight  Start oral antihypertensive agents    REVIEW OF SYSTEMS  Denies CP, Palpitation, SOB, Dyspnea [ x ] All other systems negative    MEDICATIONS  (STANDING)  carvedilol 12.5 milliGRAM(s) Oral every 12 hours  doxazosin 2 milliGRAM(s) Oral at bedtime  enoxaparin Injectable 40 milliGRAM(s) SubCutaneous daily  gabapentin 100 milliGRAM(s) Oral two times a day  levothyroxine 50 MICROGram(s) Oral daily  losartan 50 milliGRAM(s) Oral daily    MEDICATIONS  (PRN):  acetaminophen   Tablet .. 650 milliGRAM(s) Oral every 6 hours PRN Mild Pain (1 - 3), Moderate Pain (4 - 6)  bismuth subsalicylate Liquid 30 milliLiter(s) Oral two times a day PRN N/V/D, Gas, Bloating    PAST MEDICAL & SURGICAL HISTORY:  HTN (hypertension)  Hypothyroidism  S/P excision of lipoma    FAMILY HISTORY:  Family history of hypertension (Mother)  Family history of prostate cancer (Father)  Family history of diabetes mellitus    Allergy   penicillins (Unknown)  shellfish (Swelling)    ICU Vital Signs Last 24 Hrs  T(C): 37 (Max: 37.7)  HR: 112 (66 - 112)  BP: 138/69  (117/73 - 190/99)  RR: 19  (8 - 39)  SpO2: 97% (95% - 100%) on room air     I&O's Summary  IN: 995 mL / OUT: 850 mL / NET: 145 mL    PHYSICAL EXAM  Appearance: Normal, NAD  HEAD:  Atraumatic, Normocephalic  EYES: EOMI, PERRLA, conjunctiva and sclera clear  NECK: Supple, No JVD  CHEST/LUNG: Clear to auscultation bilaterally; No wheezing  HEART: Normal S1, S2. No murmurs, rubs, or gallops  ABDOMEN: + Bowel sounds, Soft, NT, ND   EXTREMITIES:  2+ Peripheral Pulses, No clubbing, cyanosis, or edema  NEUROLOGY: non-focal, aaox3  SKIN: No rashes or lesions, right radial no bleeding or hematoma     Interpretation of Telemetry: NSR                         12.2   8.43  )-----------( 248                  38.1     135  |  99  |  17  ----------------------------<  124<H>  3.7   |  22  |  1.38<1.16<1.27    Ca    9.5      Phos  3.4     Mg     1.8 (repleted)  TPro  7.2  /  Alb  4.3  /  TBili  1.1  /  DBili  x   /  AST  16  /  ALT  17  /  AlkPhos  65

## 2021-08-01 NOTE — CHART NOTE - NSCHARTNOTEFT_GEN_A_CORE
CCU Transfer Note    Transfer from: CCU    Transfer to: ( x ) Medicine    (  ) Telemetry     (   ) RCU        (    ) Palliative         (   ) Stroke Unit       (  ) MICU   (   ) __________________    Accepting Physician: Dr. Ayla Hart    Signout given to: Dr. Ayla Hart     CCU COURSE: 50 y.o M w/ PMH of HTN, Hypothyroid, BPH comes into the ED c/o epigastric pain. In the ED found to be hypertensive to 215 SBP w/ EKG changes initially read as JASON in precordial leads, but in turn really just a demonstration of LVH. Pt eleicited that he had not taken his meds x past 2 days. In setting of EKG pt was brought to cath lab which revealed non-obstructive CAD . Pt was brought to CICU and placed on Nitroglycerin gtt for HTN Urgency. nitro gtt weaned off w/ PO/IV Hydralazine. Home anti-HTN regimen resumed early this morning as below with pt's BP around 24hr goal of 160 SBP.       PAST MEDICAL & SURGICAL HISTORY:  HTN (hypertension)    Hypothyroidism    BPH        Vital Signs Last 24 Hrs  T(C): 37.1 (01 Aug 2021 00:00), Max: 37.4 (31 Jul 2021 19:30)  T(F): 98.7 (01 Aug 2021 00:00), Max: 99.4 (31 Jul 2021 19:30)  HR: 108 (01 Aug 2021 03:00) (66 - 108)  BP: 174/67 (01 Aug 2021 03:00) (117/73 - 190/99)  BP(mean): 93 (01 Aug 2021 03:00) (63 - 138)  RR: 19 (01 Aug 2021 03:00) (8 - 39)  SpO2: 96% (01 Aug 2021 03:00) (95% - 100%)  I&O's Summary    31 Jul 2021 07:01  -  01 Aug 2021 05:45  --------------------------------------------------------  IN: 415 mL / OUT: 450 mL / NET: -35 mL      Allergies    penicillins (Unknown)  shellfish (Swelling)    Intolerances      MEDICATIONS  (STANDING):  carvedilol 12.5 milliGRAM(s) Oral every 12 hours  doxazosin 2 milliGRAM(s) Oral at bedtime  hydrALAZINE 30 milliGRAM(s) Oral every 8 hours  levothyroxine 50 MICROGram(s) Oral daily  magnesium sulfate  IVPB 1 Gram(s) IV Intermittent once  potassium chloride    Tablet ER 20 milliEquivalent(s) Oral once    MEDICATIONS  (PRN):  bismuth subsalicylate Liquid 30 milliLiter(s) Oral two times a day PRN N/V/D, Gas, Bloating      Adult Advanced Hemodynamics Last 24 Hrs  CVP(mm Hg): --  CVP(cm H2O): --  CO: --  CI: --  PA: --  PA(mean): --  PCWP: --  SVR: --  SVRI: --  PVR: --  PVRI: --    CARDIAC MARKERS ( 31 Jul 2021 15:20 )  x     / x     / 356 U/L / x     / 3.8 ng/mL  CARDIAC MARKERS ( 31 Jul 2021 10:56 )  x     / x     / 438 U/L / x     / 4.6 ng/mL                            12.2   8.43  )-----------( 248      ( 01 Aug 2021 05:14 )             38.1     08-01    135  |  99  |  17  ----------------------------<  124<H>  3.7   |  22  |  1.38<H>    Ca    9.5      01 Aug 2021 05:14  Phos  3.4     08-01  Mg     1.8     08-01    TPro  7.2  /  Alb  4.3  /  TBili  1.1  /  DBili  x   /  AST  16  /  ALT  17  /  AlkPhos  65  08-01    PT/INR - ( 31 Jul 2021 10:56 )   PT: 12.8 sec;   INR: 1.07 ratio         PTT - ( 31 Jul 2021 10:56 )  PTT:24.7 sec      ASSESSMENT & PLAN:   #neuro  Sciatica  - On Gabapentin @ home, continue as prescribed    - Otherwise AOx4, cont to monitor    #respiratory  -O2 sat wnl on RA, cont to maintain spO2 >92%     #cardiovascular  HTN Urgency w/ demand ischemia (Type 2)  - JASON in anterolateral leads in setting of hypertension to 200s SBP -> LHC 7/31 revealing clean cors   - s/p labetalol and hydral IV pushes prior to CICU admission  - in setting of clean cors would hold off on continuing medical/pharmacologic therapy for ACS   - placed on Nitro gtt in setting of   - Nitro weaned down and off after 25 Hydralazine PO dosing   - restart home anti-htn regimen as pt declares he had not taken these medications x past 2 days: Coreg 12.5 BID and Losartan 50 daily (+/- Hydral 30 depending on response to home meds)  - ** Goal SBP <160 until 8/1 10am, then goal is to achieve normotension   - TTE 7/31: EF 60% w/ concentric LVH and normal RVSF     #renal  BPH  - On cardura @ home -> continue     #GI  Epigastric Pain   - LFTs wnl  - last BM this AM and +flatus, no hx of abdominal surgeries, low suspicion for bowel obstruction- no n/v/d, hematemesis, hematochezia   - f/u RUQ US, but presentation more c/w pain related to HTN urgency as resolution achieved w/ better BP control vs. sub-acute gas pain as pt demonstrated relief w/ passing flatus     Diet: DASH/TLC     #heme  - No active issues at this time     VTE ppx  - would start on SQ Heparin/Lovenox in AM     #endocrine  -no hx of DM, f/u TSH, A1c and lipid panel     #ID  -no clinical evidence for infection at this time, would continue to monitor temp, CBC and refrain from infectious w/u or antimicrobial therapy at this time           FOR FOLLOW UP:  - Optimization of Anti-HTN regimen   - F/u ABD US   - F/u A1c, TSH, Lipid panel as part of routine Cardiac ICU admission labs       Jonah Silva PA-C CICU

## 2021-08-01 NOTE — PROGRESS NOTE ADULT - ATTENDING COMMENTS
51yo gentleman s/p admission for chest pain secondary to hypertensive urgency. Normal cath and normal EF on ECHO. BP better. Compliance discussed. d/c home today with clinic f/u this week.

## 2021-08-01 NOTE — PROGRESS NOTE ADULT - ASSESSMENT
49 y/o M w/ pmhx of HTN admitted for cp s/p [facility] w/ clean coronaries , hypertensive crisis now on oral agents    #Neuro  No active issues aaox 4     #Pulm: Denies SOB or dyspnea   Sao2 % on room air   No active issues    #Cardiovascular: initially admitted w/ JASON in anterolateral leads s/p [facility] w/ clean coronaries  Admitted cicu for hypertensive crisis   on NGT gtt and now changed to PO agents w/ -160  - Coreg 12.5 and hydralazine 30 TID started  - d/c hydra and resume his home meds (Losartan 50mg) po qd  - Start chlorthalidone daily (start 8/2/21)  - Follow up w/ PCP in 1 week    # GI: Complains of abd pain now resolved   Pt reports he feels much better w/ gas passing   x ray showed non obstructive bowel gas pattern (7/31/21)  Tolerates DASH diet   +hyperactive bowel sounds  - Recommend outpatient GI work up    # Renal: Cr stable   - Recommend outpatient follow up for his kidney function    #ID: Pt remains afebrile and no leukocytosis     Discharge to home     51 y/o M w/ pmhx of HTN admitted for cp s/p [facility] w/ clean coronaries , hypertensive crisis now on oral agents    #Neuro  No active issues aaox 4     #Pulm: Denies SOB or dyspnea   Sao2 % on room air   No active issues    #Cardiovascular: initially admitted w/ JASON in anterolateral leads s/p [facility] w/ clean coronaries  Admitted cicu for hypertensive crisis   on NGT gtt and now changed to PO agents w/ -160  Echo shows normal RV/LV function   - Coreg 12.5 and hydralazine 30 TID started  - d/c hydra and resume his home meds (Losartan 50mg) po qd  - Start chlorthalidone daily (start 8/2/21)  - Follow up w/ PCP in 1 week    # GI: Complains of abd pain now resolved   Pt reports he feels much better w/ gas passing   x ray showed non obstructive bowel gas pattern (7/31/21)  Tolerates DASH diet   +hyperactive bowel sounds  - Recommend outpatient GI work up    # Renal: Cr stable   - Recommend outpatient follow up for his kidney function    #ID: Pt remains afebrile and no leukocytosis     Discharge to home

## 2021-08-01 NOTE — DISCHARGE NOTE PROVIDER - NSDCMRMEDTOKEN_GEN_ALL_CORE_FT
carvedilol 12.5 mg oral tablet: 1 tab(s) orally 2 times a day x 30 days   chlorthalidone 25 mg oral tablet: 1 tab(s) orally once a day   doxazosin 2 mg oral tablet: 1 tab(s) orally once a day  levothyroxine 50 mcg (0.05 mg) oral tablet: 1 tab(s) orally once a day  losartan 50 mg oral tablet: 1 tab(s) orally once a day

## 2021-08-01 NOTE — DISCHARGE NOTE NURSING/CASE MANAGEMENT/SOCIAL WORK - PATIENT PORTAL LINK FT
You can access the FollowMyHealth Patient Portal offered by [facility] by registering at the following website: [email or web address]. By joining [employer]’s FollowMyHealth portal, you will also be able to view your health information using other applications (apps) compatible with our system.

## 2021-11-23 NOTE — ED ADULT NURSE NOTE - EXTENSIONS OF SELF_ADULT
Hi, this is to let you know that your recent results showed:  Normal kidney function tests and blood sugar.  Acceptable liver function tests.  Normal cholesterol.  Normal thyroid.  Normal blood counts.
None

## 2021-12-02 ENCOUNTER — NON-APPOINTMENT (OUTPATIENT)
Age: 51
End: 2021-12-02

## 2021-12-02 DIAGNOSIS — D3A.8 OTHER BENIGN NEUROENDOCRINE TUMORS: ICD-10-CM

## 2021-12-15 ENCOUNTER — OUTPATIENT (OUTPATIENT)
Dept: OUTPATIENT SERVICES | Facility: HOSPITAL | Age: 51
LOS: 1 days | End: 2021-12-15

## 2021-12-15 VITALS
WEIGHT: 214.95 LBS | OXYGEN SATURATION: 98 % | DIASTOLIC BLOOD PRESSURE: 78 MMHG | HEIGHT: 69 IN | SYSTOLIC BLOOD PRESSURE: 124 MMHG | HEART RATE: 70 BPM | TEMPERATURE: 98 F | RESPIRATION RATE: 16 BRPM

## 2021-12-15 DIAGNOSIS — D3A.00 BENIGN CARCINOID TUMOR OF UNSPECIFIED SITE: ICD-10-CM

## 2021-12-15 DIAGNOSIS — I10 ESSENTIAL (PRIMARY) HYPERTENSION: ICD-10-CM

## 2021-12-15 DIAGNOSIS — Z90.49 ACQUIRED ABSENCE OF OTHER SPECIFIED PARTS OF DIGESTIVE TRACT: Chronic | ICD-10-CM

## 2021-12-15 DIAGNOSIS — Z98.890 OTHER SPECIFIED POSTPROCEDURAL STATES: Chronic | ICD-10-CM

## 2021-12-15 DIAGNOSIS — D3A.8 OTHER BENIGN NEUROENDOCRINE TUMORS: ICD-10-CM

## 2021-12-15 LAB
ANION GAP SERPL CALC-SCNC: 10 MMOL/L — SIGNIFICANT CHANGE UP (ref 7–14)
BUN SERPL-MCNC: 16 MG/DL — SIGNIFICANT CHANGE UP (ref 7–23)
CALCIUM SERPL-MCNC: 9.2 MG/DL — SIGNIFICANT CHANGE UP (ref 8.4–10.5)
CHLORIDE SERPL-SCNC: 104 MMOL/L — SIGNIFICANT CHANGE UP (ref 98–107)
CO2 SERPL-SCNC: 26 MMOL/L — SIGNIFICANT CHANGE UP (ref 22–31)
CREAT SERPL-MCNC: 1.99 MG/DL — HIGH (ref 0.5–1.3)
GLUCOSE SERPL-MCNC: 99 MG/DL — SIGNIFICANT CHANGE UP (ref 70–99)
HCT VFR BLD CALC: 32.2 % — LOW (ref 39–50)
HGB BLD-MCNC: 9.7 G/DL — LOW (ref 13–17)
MCHC RBC-ENTMCNC: 22.1 PG — LOW (ref 27–34)
MCHC RBC-ENTMCNC: 30.1 GM/DL — LOW (ref 32–36)
MCV RBC AUTO: 73.3 FL — LOW (ref 80–100)
NRBC # BLD: 0 /100 WBCS — SIGNIFICANT CHANGE UP
NRBC # FLD: 0 K/UL — SIGNIFICANT CHANGE UP
PLATELET # BLD AUTO: 288 K/UL — SIGNIFICANT CHANGE UP (ref 150–400)
POTASSIUM SERPL-MCNC: 4 MMOL/L — SIGNIFICANT CHANGE UP (ref 3.5–5.3)
POTASSIUM SERPL-SCNC: 4 MMOL/L — SIGNIFICANT CHANGE UP (ref 3.5–5.3)
RBC # BLD: 4.39 M/UL — SIGNIFICANT CHANGE UP (ref 4.2–5.8)
RBC # FLD: 16.9 % — HIGH (ref 10.3–14.5)
SODIUM SERPL-SCNC: 140 MMOL/L — SIGNIFICANT CHANGE UP (ref 135–145)
WBC # BLD: 2.99 K/UL — LOW (ref 3.8–10.5)
WBC # FLD AUTO: 2.99 K/UL — LOW (ref 3.8–10.5)

## 2021-12-15 NOTE — H&P PST ADULT - NSICDXPASTMEDICALHX_GEN_ALL_CORE_FT
PAST MEDICAL HISTORY:  HTN (hypertension)     Hypothyroidism      PAST MEDICAL HISTORY:  HTN (hypertension)     Hypothyroidism     Vitamin B12 deficiency anemia

## 2021-12-15 NOTE — H&P PST ADULT - HISTORY OF PRESENT ILLNESS
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

## 2021-12-15 NOTE — H&P PST ADULT - PROBLEM SELECTOR PLAN 1
Patient tentatively scheduled for  upper endoscopic ultrasound endomuscosal resection on 01/04/2021.  Pre-op instructions provided. Pt given verbal and written instructions with teach back on pepcid. Pt verbalized understanding with return demonstration.   Pt strongly advised to follow up with surgeon to discuss COVID testing requirements prior to procedure.

## 2021-12-15 NOTE — H&P PST ADULT - PROBLEM SELECTOR PLAN 2
Patient instructed to take Hydrochlorothiazide, Amlodipine, Carvedilol, losartan,  with a sip of water on the morning of procedure.

## 2021-12-15 NOTE — H&P PST ADULT - NSICDXPASTSURGICALHX_GEN_ALL_CORE_FT
PAST SURGICAL HISTORY:  History of cholecystectomy 10/15/2021    History of colonoscopy with polypectomy endoscopy , 08/2021    S/P excision of lipoma

## 2021-12-15 NOTE — H&P PST ADULT - GASTROINTESTINAL COMMENTS
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 Pt reports he is doing this procedure to  follow up for lap allyssa and remove  polyps Hx Lap allyssa 11/2021 and Stomach Polyps . Pt reports he is doing this procedure to  follow up for lap allyssa and remove  polyps

## 2022-01-04 ENCOUNTER — APPOINTMENT (OUTPATIENT)
Dept: GASTROENTEROLOGY | Facility: HOSPITAL | Age: 52
End: 2022-01-04

## 2022-01-04 ENCOUNTER — OUTPATIENT (OUTPATIENT)
Dept: OUTPATIENT SERVICES | Facility: HOSPITAL | Age: 52
LOS: 1 days | Discharge: HOME | End: 2022-01-04
Payer: COMMERCIAL

## 2022-01-04 ENCOUNTER — RESULT REVIEW (OUTPATIENT)
Age: 52
End: 2022-01-04

## 2022-01-04 VITALS
WEIGHT: 212.08 LBS | TEMPERATURE: 99 F | HEIGHT: 71 IN | OXYGEN SATURATION: 98 % | DIASTOLIC BLOOD PRESSURE: 84 MMHG | HEART RATE: 75 BPM | SYSTOLIC BLOOD PRESSURE: 126 MMHG | RESPIRATION RATE: 14 BRPM

## 2022-01-04 VITALS
DIASTOLIC BLOOD PRESSURE: 87 MMHG | SYSTOLIC BLOOD PRESSURE: 138 MMHG | HEART RATE: 70 BPM | OXYGEN SATURATION: 94 % | RESPIRATION RATE: 12 BRPM

## 2022-01-04 DIAGNOSIS — Z98.890 OTHER SPECIFIED POSTPROCEDURAL STATES: Chronic | ICD-10-CM

## 2022-01-04 DIAGNOSIS — Z90.49 ACQUIRED ABSENCE OF OTHER SPECIFIED PARTS OF DIGESTIVE TRACT: Chronic | ICD-10-CM

## 2022-01-04 DIAGNOSIS — D3A.8 OTHER BENIGN NEUROENDOCRINE TUMORS: ICD-10-CM

## 2022-01-04 PROCEDURE — 88342 IMHCHEM/IMCYTCHM 1ST ANTB: CPT | Mod: 26,59

## 2022-01-04 PROCEDURE — 43254 EGD ENDO MUCOSAL RESECTION: CPT | Mod: GC

## 2022-01-04 PROCEDURE — 43259 EGD US EXAM DUODENUM/JEJUNUM: CPT | Mod: GC

## 2022-01-04 PROCEDURE — 88341 IMHCHEM/IMCYTCHM EA ADD ANTB: CPT | Mod: 26,59

## 2022-01-04 PROCEDURE — 43239 EGD BIOPSY SINGLE/MULTIPLE: CPT | Mod: 59,GC

## 2022-01-04 PROCEDURE — 88360 TUMOR IMMUNOHISTOCHEM/MANUAL: CPT | Mod: 26

## 2022-01-04 PROCEDURE — 88305 TISSUE EXAM BY PATHOLOGIST: CPT | Mod: 26

## 2022-01-04 PROCEDURE — 43251 EGD REMOVE LESION SNARE: CPT | Mod: 59,GC

## 2022-01-04 DEVICE — SYR ORISE GEL TWIN 23G 10ML: Type: IMPLANTABLE DEVICE | Status: FUNCTIONAL

## 2022-01-04 DEVICE — IMPLANTABLE DEVICE: Type: IMPLANTABLE DEVICE | Status: FUNCTIONAL

## 2022-01-04 DEVICE — CLIP RESOLUTION 360 ULTRA 235CM 20/BX: Type: IMPLANTABLE DEVICE | Status: FUNCTIONAL

## 2022-01-04 NOTE — PACU DISCHARGE NOTE - MENTAL STATUS: PATIENT PARTICIPATION
Awake Rhomboid Transposition Flap Text: The defect edges were debeveled with a #15 scalpel blade.  Given the location of the defect and the proximity to free margins a rhomboid transposition flap was deemed most appropriate.  Using a sterile surgical marker, an appropriate rhomboid flap was drawn incorporating the defect.    The area thus outlined was incised deep to adipose tissue with a #15 scalpel blade.  The skin margins were undermined to an appropriate distance in all directions utilizing iris scissors.

## 2022-01-04 NOTE — ASU PATIENT PROFILE, ADULT - FALL HARM RISK - UNIVERSAL INTERVENTIONS
Bed in lowest position, wheels locked, appropriate side rails in place/Call bell, personal items and telephone in reach/Instruct patient to call for assistance before getting out of bed or chair/Non-slip footwear when patient is out of bed/[place] to call system/Physically safe environment - no spills, clutter or unnecessary equipment/Purposeful Proactive Rounding/Room/bathroom lighting operational, light cord in reach

## 2022-05-18 ENCOUNTER — NON-APPOINTMENT (OUTPATIENT)
Age: 52
End: 2022-05-18

## 2022-05-18 DIAGNOSIS — K31.7 POLYP OF STOMACH AND DUODENUM: ICD-10-CM

## 2022-06-22 PROBLEM — D51.9 VITAMIN B12 DEFICIENCY ANEMIA, UNSPECIFIED: Chronic | Status: ACTIVE | Noted: 2021-12-15

## 2022-08-10 ENCOUNTER — OUTPATIENT (OUTPATIENT)
Dept: OUTPATIENT SERVICES | Facility: HOSPITAL | Age: 52
LOS: 1 days | End: 2022-08-10

## 2022-08-10 VITALS
SYSTOLIC BLOOD PRESSURE: 133 MMHG | HEART RATE: 72 BPM | OXYGEN SATURATION: 99 % | RESPIRATION RATE: 16 BRPM | TEMPERATURE: 98 F | WEIGHT: 229.06 LBS | DIASTOLIC BLOOD PRESSURE: 88 MMHG | HEIGHT: 69.5 IN

## 2022-08-10 DIAGNOSIS — Z98.890 OTHER SPECIFIED POSTPROCEDURAL STATES: Chronic | ICD-10-CM

## 2022-08-10 DIAGNOSIS — K31.7 POLYP OF STOMACH AND DUODENUM: ICD-10-CM

## 2022-08-10 DIAGNOSIS — Z90.49 ACQUIRED ABSENCE OF OTHER SPECIFIED PARTS OF DIGESTIVE TRACT: Chronic | ICD-10-CM

## 2022-08-10 LAB
ANION GAP SERPL CALC-SCNC: 11 MMOL/L — SIGNIFICANT CHANGE UP (ref 7–14)
BUN SERPL-MCNC: 23 MG/DL — SIGNIFICANT CHANGE UP (ref 7–23)
CALCIUM SERPL-MCNC: 9.6 MG/DL — SIGNIFICANT CHANGE UP (ref 8.4–10.5)
CHLORIDE SERPL-SCNC: 104 MMOL/L — SIGNIFICANT CHANGE UP (ref 98–107)
CO2 SERPL-SCNC: 24 MMOL/L — SIGNIFICANT CHANGE UP (ref 22–31)
CREAT SERPL-MCNC: 1.69 MG/DL — HIGH (ref 0.5–1.3)
EGFR: 49 ML/MIN/1.73M2 — LOW
GLUCOSE SERPL-MCNC: 105 MG/DL — HIGH (ref 70–99)
HCT VFR BLD CALC: 36.6 % — LOW (ref 39–50)
HGB BLD-MCNC: 11.2 G/DL — LOW (ref 13–17)
MCHC RBC-ENTMCNC: 22 PG — LOW (ref 27–34)
MCHC RBC-ENTMCNC: 30.6 GM/DL — LOW (ref 32–36)
MCV RBC AUTO: 71.9 FL — LOW (ref 80–100)
NRBC # BLD: 0 /100 WBCS — SIGNIFICANT CHANGE UP
NRBC # FLD: 0 K/UL — SIGNIFICANT CHANGE UP
PLATELET # BLD AUTO: 245 K/UL — SIGNIFICANT CHANGE UP (ref 150–400)
POTASSIUM SERPL-MCNC: 3.6 MMOL/L — SIGNIFICANT CHANGE UP (ref 3.5–5.3)
POTASSIUM SERPL-SCNC: 3.6 MMOL/L — SIGNIFICANT CHANGE UP (ref 3.5–5.3)
RBC # BLD: 5.09 M/UL — SIGNIFICANT CHANGE UP (ref 4.2–5.8)
RBC # FLD: 14.8 % — HIGH (ref 10.3–14.5)
SODIUM SERPL-SCNC: 139 MMOL/L — SIGNIFICANT CHANGE UP (ref 135–145)
WBC # BLD: 2.77 K/UL — LOW (ref 3.8–10.5)
WBC # FLD AUTO: 2.77 K/UL — LOW (ref 3.8–10.5)

## 2022-08-10 PROCEDURE — 93010 ELECTROCARDIOGRAM REPORT: CPT

## 2022-08-10 RX ORDER — DOXAZOSIN MESYLATE 4 MG
1 TABLET ORAL
Qty: 0 | Refills: 0 | DISCHARGE

## 2022-08-10 NOTE — H&P PST ADULT - PROBLEM SELECTOR PLAN 1
Pt. is scheduled for an endoscopic ultrasound 9/1/22.  Pt. verbalized understanding of instructions.

## 2022-08-10 NOTE — H&P PST ADULT - RX
pt. never used CPAP that was ordered, lost significant amount of weight, had a repeat study, afterwards was told no longer needed CPAP

## 2022-08-10 NOTE — H&P PST ADULT - NSICDXPASTMEDICALHX_GEN_ALL_CORE_FT
PAST MEDICAL HISTORY:  HTN (hypertension)     Hypothyroidism     Vitamin B12 deficiency anemia      PAST MEDICAL HISTORY:  Gastric polyps     HTN (hypertension)     Hypothyroidism     Obese     Vitamin B12 deficiency anemia

## 2022-08-29 LAB — SARS-COV-2 RNA SPEC QL NAA+PROBE: SIGNIFICANT CHANGE UP

## 2022-09-01 ENCOUNTER — APPOINTMENT (OUTPATIENT)
Dept: GASTROENTEROLOGY | Facility: HOSPITAL | Age: 52
End: 2022-09-01

## 2022-09-01 ENCOUNTER — OUTPATIENT (OUTPATIENT)
Dept: OUTPATIENT SERVICES | Facility: HOSPITAL | Age: 52
LOS: 1 days | Discharge: HOME | End: 2022-09-01

## 2022-09-01 ENCOUNTER — RESULT REVIEW (OUTPATIENT)
Age: 52
End: 2022-09-01

## 2022-09-01 VITALS
DIASTOLIC BLOOD PRESSURE: 72 MMHG | TEMPERATURE: 98 F | HEART RATE: 68 BPM | SYSTOLIC BLOOD PRESSURE: 127 MMHG | WEIGHT: 229.94 LBS | HEIGHT: 70 IN | RESPIRATION RATE: 12 BRPM | OXYGEN SATURATION: 97 %

## 2022-09-01 VITALS
DIASTOLIC BLOOD PRESSURE: 76 MMHG | HEART RATE: 70 BPM | OXYGEN SATURATION: 100 % | SYSTOLIC BLOOD PRESSURE: 113 MMHG | RESPIRATION RATE: 12 BRPM

## 2022-09-01 DIAGNOSIS — K31.7 POLYP OF STOMACH AND DUODENUM: ICD-10-CM

## 2022-09-01 DIAGNOSIS — Z98.890 OTHER SPECIFIED POSTPROCEDURAL STATES: Chronic | ICD-10-CM

## 2022-09-01 DIAGNOSIS — Z90.49 ACQUIRED ABSENCE OF OTHER SPECIFIED PARTS OF DIGESTIVE TRACT: Chronic | ICD-10-CM

## 2022-09-01 PROCEDURE — 88305 TISSUE EXAM BY PATHOLOGIST: CPT | Mod: 26

## 2022-09-01 PROCEDURE — 88341 IMHCHEM/IMCYTCHM EA ADD ANTB: CPT | Mod: 26

## 2022-09-01 PROCEDURE — 88342 IMHCHEM/IMCYTCHM 1ST ANTB: CPT | Mod: 26

## 2022-09-01 PROCEDURE — 43259 EGD US EXAM DUODENUM/JEJUNUM: CPT | Mod: GC

## 2022-09-01 PROCEDURE — 43239 EGD BIOPSY SINGLE/MULTIPLE: CPT | Mod: GC,59

## 2022-09-01 NOTE — ASU PREOP CHECKLIST - LATEX ALLERGY
"Clinic Care Coordination Contact    Follow Up Progress Note      Assessment: patient was seen in the ER on 8/9/19 for abdominal wall pain. Patient is s/p laparoscopic umbilical hernia repair with mesh that was performed 7/17/2019 by Dr. Soto.  Patient stated [Im] \"Doing fine, had MRI and found unknown source of fluid. Im okay, told will clear up with in 2 weeks.\" patient has 0/10 pain, and was reassured in ER.  patient has copy of discharge summary for reference. CC RN advised to call or make an appointment with any questions or concerns.      Goals addressed this encounter:   Goals Addressed     None           Intervention/Education provided during outreach: as above     Outreach Frequency: monthly    Plan:   1. Patient will follow discharge summary.  2. Patient will follow up should symptoms worsen, change or patient feels there is a need further care.  3. Pt encouraged to contact Care Coordinator through the clinic if situation changes and assistance is needed. follow-up planned in 2-3 weeks to follow up on goal.    SHAWANDA Landin RN Clinic Care Coordinator   [place], [place], [place]  Phone: [phone]            "
no

## 2022-09-01 NOTE — ASU PREOP CHECKLIST - IV STARTED
yes [employer] Counseling:  I discussed with the patient the risks of mycophenolate mofetil including but not limited to infection/immunosuppression, GI upset, hypokalemia, hypercholesterolemia, bone marrow suppression, lymphoproliferative disorders, malignancy, GI ulceration/bleed/perforation, colitis, interstitial lung disease, kidney failure, progressive multifocal leukoencephalopathy, and birth defects.  The patient understands that monitoring is required including a baseline creatinine and regular CBC testing. In addition, patient must alert us immediately if symptoms of infection or other concerning signs are noted.

## 2022-09-01 NOTE — ASU PATIENT PROFILE, ADULT - NSICDXPASTMEDICALHX_GEN_ALL_CORE_FT
PAST MEDICAL HISTORY:  Gastric polyps     HTN (hypertension)     Hypothyroidism     Obese     Vitamin B12 deficiency anemia

## 2022-09-01 NOTE — ASU PATIENT PROFILE, ADULT - FALL HARM RISK - UNIVERSAL INTERVENTIONS
Bed in lowest position, wheels locked, appropriate side rails in place/Call bell, personal items and telephone in reach/Instruct patient to call for assistance before getting out of bed or chair/Non-slip footwear when patient is out of bed/[place] to call system/Physically safe environment - no spills, clutter or unnecessary equipment/Purposeful Proactive Rounding/Room/bathroom lighting operational, light cord in reach

## 2022-09-08 ENCOUNTER — NON-APPOINTMENT (OUTPATIENT)
Age: 52
End: 2022-09-08

## 2023-03-14 NOTE — ASU PATIENT PROFILE, ADULT - FALL HARM RISK - FALL HARM RISK
Called patient to confirm contact information for research study of Dr. Ryan's. Patient provided information, and confirmed appointment with Dr. Ryan in the future. Ashtyn Cadena RN on 3/14/2023 at 8:33 AM     No indicators present

## 2023-08-11 ENCOUNTER — NON-APPOINTMENT (OUTPATIENT)
Age: 53
End: 2023-08-11

## 2023-08-12 PROBLEM — E66.9 OBESITY, UNSPECIFIED: Chronic | Status: ACTIVE | Noted: 2022-08-10

## 2023-08-12 PROBLEM — K31.7 POLYP OF STOMACH AND DUODENUM: Chronic | Status: ACTIVE | Noted: 2022-08-10

## 2023-10-24 ENCOUNTER — OUTPATIENT (OUTPATIENT)
Dept: OUTPATIENT SERVICES | Facility: HOSPITAL | Age: 53
LOS: 1 days | End: 2023-10-24
Payer: COMMERCIAL

## 2023-10-24 VITALS
HEIGHT: 70 IN | TEMPERATURE: 98 F | OXYGEN SATURATION: 96 % | SYSTOLIC BLOOD PRESSURE: 127 MMHG | WEIGHT: 250 LBS | DIASTOLIC BLOOD PRESSURE: 86 MMHG | HEART RATE: 84 BPM | RESPIRATION RATE: 16 BRPM

## 2023-10-24 DIAGNOSIS — K31.7 POLYP OF STOMACH AND DUODENUM: ICD-10-CM

## 2023-10-24 DIAGNOSIS — D3A.8 OTHER BENIGN NEUROENDOCRINE TUMORS: ICD-10-CM

## 2023-10-24 DIAGNOSIS — Z98.890 OTHER SPECIFIED POSTPROCEDURAL STATES: Chronic | ICD-10-CM

## 2023-10-24 DIAGNOSIS — Z90.49 ACQUIRED ABSENCE OF OTHER SPECIFIED PARTS OF DIGESTIVE TRACT: Chronic | ICD-10-CM

## 2023-10-24 DIAGNOSIS — I10 ESSENTIAL (PRIMARY) HYPERTENSION: ICD-10-CM

## 2023-10-24 DIAGNOSIS — E03.9 HYPOTHYROIDISM, UNSPECIFIED: ICD-10-CM

## 2023-10-24 LAB
ANION GAP SERPL CALC-SCNC: 9 MMOL/L — SIGNIFICANT CHANGE UP (ref 7–14)
ANION GAP SERPL CALC-SCNC: 9 MMOL/L — SIGNIFICANT CHANGE UP (ref 7–14)
BUN SERPL-MCNC: 19 MG/DL — SIGNIFICANT CHANGE UP (ref 7–23)
BUN SERPL-MCNC: 19 MG/DL — SIGNIFICANT CHANGE UP (ref 7–23)
CALCIUM SERPL-MCNC: 9.3 MG/DL — SIGNIFICANT CHANGE UP (ref 8.4–10.5)
CALCIUM SERPL-MCNC: 9.3 MG/DL — SIGNIFICANT CHANGE UP (ref 8.4–10.5)
CHLORIDE SERPL-SCNC: 104 MMOL/L — SIGNIFICANT CHANGE UP (ref 98–107)
CHLORIDE SERPL-SCNC: 104 MMOL/L — SIGNIFICANT CHANGE UP (ref 98–107)
CO2 SERPL-SCNC: 27 MMOL/L — SIGNIFICANT CHANGE UP (ref 22–31)
CO2 SERPL-SCNC: 27 MMOL/L — SIGNIFICANT CHANGE UP (ref 22–31)
CREAT SERPL-MCNC: 1.63 MG/DL — HIGH (ref 0.5–1.3)
CREAT SERPL-MCNC: 1.63 MG/DL — HIGH (ref 0.5–1.3)
EGFR: 51 ML/MIN/1.73M2 — LOW
EGFR: 51 ML/MIN/1.73M2 — LOW
GLUCOSE SERPL-MCNC: 90 MG/DL — SIGNIFICANT CHANGE UP (ref 70–99)
GLUCOSE SERPL-MCNC: 90 MG/DL — SIGNIFICANT CHANGE UP (ref 70–99)
HCT VFR BLD CALC: 39.3 % — SIGNIFICANT CHANGE UP (ref 39–50)
HCT VFR BLD CALC: 39.3 % — SIGNIFICANT CHANGE UP (ref 39–50)
HGB BLD-MCNC: 12 G/DL — LOW (ref 13–17)
HGB BLD-MCNC: 12 G/DL — LOW (ref 13–17)
MCHC RBC-ENTMCNC: 22 PG — LOW (ref 27–34)
MCHC RBC-ENTMCNC: 22 PG — LOW (ref 27–34)
MCHC RBC-ENTMCNC: 30.5 GM/DL — LOW (ref 32–36)
MCHC RBC-ENTMCNC: 30.5 GM/DL — LOW (ref 32–36)
MCV RBC AUTO: 72.1 FL — LOW (ref 80–100)
MCV RBC AUTO: 72.1 FL — LOW (ref 80–100)
NRBC # BLD: 0 /100 WBCS — SIGNIFICANT CHANGE UP (ref 0–0)
NRBC # BLD: 0 /100 WBCS — SIGNIFICANT CHANGE UP (ref 0–0)
NRBC # FLD: 0 K/UL — SIGNIFICANT CHANGE UP (ref 0–0)
NRBC # FLD: 0 K/UL — SIGNIFICANT CHANGE UP (ref 0–0)
PLATELET # BLD AUTO: 297 K/UL — SIGNIFICANT CHANGE UP (ref 150–400)
PLATELET # BLD AUTO: 297 K/UL — SIGNIFICANT CHANGE UP (ref 150–400)
POTASSIUM SERPL-MCNC: 4.4 MMOL/L — SIGNIFICANT CHANGE UP (ref 3.5–5.3)
POTASSIUM SERPL-MCNC: 4.4 MMOL/L — SIGNIFICANT CHANGE UP (ref 3.5–5.3)
POTASSIUM SERPL-SCNC: 4.4 MMOL/L — SIGNIFICANT CHANGE UP (ref 3.5–5.3)
POTASSIUM SERPL-SCNC: 4.4 MMOL/L — SIGNIFICANT CHANGE UP (ref 3.5–5.3)
RBC # BLD: 5.45 M/UL — SIGNIFICANT CHANGE UP (ref 4.2–5.8)
RBC # BLD: 5.45 M/UL — SIGNIFICANT CHANGE UP (ref 4.2–5.8)
RBC # FLD: 15.9 % — HIGH (ref 10.3–14.5)
RBC # FLD: 15.9 % — HIGH (ref 10.3–14.5)
SODIUM SERPL-SCNC: 140 MMOL/L — SIGNIFICANT CHANGE UP (ref 135–145)
SODIUM SERPL-SCNC: 140 MMOL/L — SIGNIFICANT CHANGE UP (ref 135–145)
WBC # BLD: 3.11 K/UL — LOW (ref 3.8–10.5)
WBC # BLD: 3.11 K/UL — LOW (ref 3.8–10.5)
WBC # FLD AUTO: 3.11 K/UL — LOW (ref 3.8–10.5)
WBC # FLD AUTO: 3.11 K/UL — LOW (ref 3.8–10.5)

## 2023-10-24 PROCEDURE — 93010 ELECTROCARDIOGRAM REPORT: CPT

## 2023-10-24 RX ORDER — LEVOTHYROXINE SODIUM 125 MCG
1 TABLET ORAL
Refills: 0 | DISCHARGE

## 2023-10-24 RX ORDER — AMLODIPINE BESYLATE 2.5 MG/1
1 TABLET ORAL
Refills: 0 | DISCHARGE

## 2023-10-24 RX ORDER — SODIUM CHLORIDE 9 MG/ML
1000 INJECTION, SOLUTION INTRAVENOUS
Refills: 0 | Status: DISCONTINUED | OUTPATIENT
Start: 2023-10-31 | End: 2023-11-14

## 2023-10-24 RX ORDER — CARVEDILOL PHOSPHATE 80 MG/1
1 CAPSULE, EXTENDED RELEASE ORAL
Refills: 0 | DISCHARGE

## 2023-10-24 NOTE — H&P PST ADULT - FUNCTIONAL STATUS
Mets 4.19, able to climb a flight of stairs, carry groceries and walk 1-2 blocks on level ground/4-10 METS

## 2023-10-24 NOTE — H&P PST ADULT - ASSESSMENT
54 yo male with pre-op diagnosis of gastric polyps scheduled for endoscopic ultrasound with Dr. Serra.

## 2023-10-24 NOTE — H&P PST ADULT - HISTORY OF PRESENT ILLNESS
52 yo male presents to PST unit with pre-op diagnosis of gastric polyps scheduled for endoscopic ultrasound with Dr. Serra.

## 2023-10-24 NOTE — H&P PST ADULT - PROBLEM SELECTOR PLAN 1
-Scheduled for endoscopic ultrasound with Dr. Serra on 10/31/2023.   Verbal and written pre-op instructions provided to patient. Patient verbalized understanding and will call surgeons office for revised instructions if surgery is rescheduled.   Verbal and written pre-op instructions provided to patient. Patient verbalized understanding and will call surgeons office for revised instructions if surgery is rescheduled.

## 2023-10-31 ENCOUNTER — OUTPATIENT (OUTPATIENT)
Dept: OUTPATIENT SERVICES | Facility: HOSPITAL | Age: 53
LOS: 1 days | Discharge: HOME | End: 2023-10-31
Payer: COMMERCIAL

## 2023-10-31 ENCOUNTER — TRANSCRIPTION ENCOUNTER (OUTPATIENT)
Age: 53
End: 2023-10-31

## 2023-10-31 ENCOUNTER — RESULT REVIEW (OUTPATIENT)
Age: 53
End: 2023-10-31

## 2023-10-31 ENCOUNTER — APPOINTMENT (OUTPATIENT)
Dept: GASTROENTEROLOGY | Facility: HOSPITAL | Age: 53
End: 2023-10-31

## 2023-10-31 VITALS
RESPIRATION RATE: 13 BRPM | DIASTOLIC BLOOD PRESSURE: 80 MMHG | SYSTOLIC BLOOD PRESSURE: 121 MMHG | HEART RATE: 63 BPM | OXYGEN SATURATION: 97 %

## 2023-10-31 VITALS
OXYGEN SATURATION: 96 % | HEIGHT: 71 IN | RESPIRATION RATE: 12 BRPM | SYSTOLIC BLOOD PRESSURE: 138 MMHG | WEIGHT: 246.04 LBS | DIASTOLIC BLOOD PRESSURE: 85 MMHG | TEMPERATURE: 98 F | HEART RATE: 67 BPM

## 2023-10-31 DIAGNOSIS — D3A.8 OTHER BENIGN NEUROENDOCRINE TUMORS: ICD-10-CM

## 2023-10-31 DIAGNOSIS — Z98.890 OTHER SPECIFIED POSTPROCEDURAL STATES: Chronic | ICD-10-CM

## 2023-10-31 DIAGNOSIS — Z90.49 ACQUIRED ABSENCE OF OTHER SPECIFIED PARTS OF DIGESTIVE TRACT: Chronic | ICD-10-CM

## 2023-10-31 PROCEDURE — 43251 EGD REMOVE LESION SNARE: CPT | Mod: GC

## 2023-10-31 PROCEDURE — 43239 EGD BIOPSY SINGLE/MULTIPLE: CPT | Mod: GC,59

## 2023-10-31 PROCEDURE — 88305 TISSUE EXAM BY PATHOLOGIST: CPT | Mod: 26

## 2023-10-31 PROCEDURE — 88360 TUMOR IMMUNOHISTOCHEM/MANUAL: CPT | Mod: 26,59

## 2023-10-31 PROCEDURE — 43259 EGD US EXAM DUODENUM/JEJUNUM: CPT | Mod: GC

## 2023-10-31 PROCEDURE — 88342 IMHCHEM/IMCYTCHM 1ST ANTB: CPT | Mod: 26

## 2023-10-31 PROCEDURE — 88341 IMHCHEM/IMCYTCHM EA ADD ANTB: CPT | Mod: 26

## 2023-10-31 RX ORDER — SODIUM CHLORIDE 9 MG/ML
500 INJECTION INTRAMUSCULAR; INTRAVENOUS; SUBCUTANEOUS
Refills: 0 | Status: DISCONTINUED | OUTPATIENT
Start: 2023-10-31 | End: 2023-11-14

## 2023-10-31 NOTE — PRE PROCEDURE NOTE - PRE PROCEDURE EVALUATION
Attending Physician:   Dr. Serra    Procedure: EGD/EUS    Indication for Procedure: G1 Gastric NET surveillance    ________________________________________________________  PAST MEDICAL & SURGICAL HISTORY:  HTN (hypertension)      Hypothyroidism      Vitamin B12 deficiency anemia      Gastric polyps      Obese      S/P excision of lipoma      History of cholecystectomy  10/15/2021      History of colonoscopy with polypectomy  endoscopy , 08/2021        ALLERGIES:  Tree pollen (Rhinorrhea; Rhinitis)  dust (Rhinorrhea; Rhinitis)  shellfish (Swelling)  penicillins (Unknown)    HOME MEDICATIONS:  amLODIPine 10 mg oral tablet: 1 tab(s) orally once a day  carvedilol 3.125 mg oral tablet: 1 tab(s) orally 2 times a day  levothyroxine 100 mcg (0.1 mg) oral tablet: 1 tab(s) orally 4 times a week Tue, Thur, Sat, Sun  levothyroxine 88 mcg (0.088 mg) oral tablet: 1 tab(s) orally 3 times a week Mon, Wed, Fri    AICD/PPM: [ ] yes   [x ] no    PERTINENT LAB DATA:                      PHYSICAL EXAMINATION:  VSS    Constitutional: NAD  HEENT: PERRLA, EOMI,    Neck:  No JVD  Respiratory: CTAB/L  Cardiovascular: S1 and S2  Gastrointestinal: BS+, soft, NT/ND  Extremities: No peripheral edema  Neurological: A/O x 3, no focal deficits  Psychiatric: Normal mood, normal affect  Skin: No rashes    ASA Class: I [ ]  II [x ]  III []  IV [ ]    COMMENTS:    The patient is a suitable candidate for the planned procedure unless box checked [ ]  No, explain:    I explained the risks (bleeding, infection, perforation, CV risk, anesthesia risk)/b/a with the patient. The patient agrees to proceed. Patient had opportunity to ask questions in preprocedure bay 3. All questions answered.

## 2023-10-31 NOTE — ASU PATIENT PROFILE, ADULT - CAREGIVER
[Chaperone Present] : A chaperone was present in the examining room during all aspects of the physical examination [FreeTextEntry1] : General: Well, appearing. Alert and orientated. No acute distress\par HEENT: Normocephalic, atraumatic and extraocular muscles appear to be intact \par Neck: Full range of motion, no obvious lymphadenopathy, deformities, or masses noted \par Respiratory: Speaking in full sentences comfortably, normal work of breathing and no cough during visit\par Musculoskeletal: active full range of motion in extremities \par Extremities: No upper extremity edema noted\par Skin: no obvious rash or skin lesions\par Neuro: Orientated X 3, speech is fluent, normal rate\par Psych: Normal mood and affect \par \par  [Atrophy] : atrophy [Normal rectal exam] : was normal [de-identified] : no fistula  Yes

## 2023-10-31 NOTE — ASU DISCHARGE PLAN (ADULT/PEDIATRIC) - NS MD DC FALL RISK RISK
For information on Fall & Injury Prevention, visit: [email or web address] OR  [email or web address] OR  https://www.cdc.gov/steadi/patient.html

## 2023-10-31 NOTE — ASU PATIENT PROFILE, ADULT - FALL HARM RISK - UNIVERSAL INTERVENTIONS
Bed in lowest position, wheels locked, appropriate side rails in place/Call bell, personal items and telephone in reach/Instruct patient to call for assistance before getting out of bed or chair/Non-slip footwear when patient is out of bed/[place] to call system/Physically safe environment - no spills, clutter or unnecessary equipment/Purposeful Proactive Rounding/Room/bathroom lighting operational, light cord in reach

## 2023-11-09 ENCOUNTER — NON-APPOINTMENT (OUTPATIENT)
Age: 53
End: 2023-11-09

## 2023-11-10 ENCOUNTER — NON-APPOINTMENT (OUTPATIENT)
Age: 53
End: 2023-11-10

## 2024-06-10 ENCOUNTER — NON-APPOINTMENT (OUTPATIENT)
Age: 54
End: 2024-06-10

## 2024-10-08 ENCOUNTER — OUTPATIENT (OUTPATIENT)
Dept: OUTPATIENT SERVICES | Facility: HOSPITAL | Age: 54
LOS: 1 days | Discharge: HOME | End: 2024-10-08
Payer: COMMERCIAL

## 2024-10-08 ENCOUNTER — APPOINTMENT (OUTPATIENT)
Dept: GASTROENTEROLOGY | Facility: HOSPITAL | Age: 54
End: 2024-10-08

## 2024-10-08 ENCOUNTER — RESULT REVIEW (OUTPATIENT)
Age: 54
End: 2024-10-08

## 2024-10-08 ENCOUNTER — TRANSCRIPTION ENCOUNTER (OUTPATIENT)
Age: 54
End: 2024-10-08

## 2024-10-08 VITALS
DIASTOLIC BLOOD PRESSURE: 97 MMHG | SYSTOLIC BLOOD PRESSURE: 139 MMHG | HEART RATE: 78 BPM | TEMPERATURE: 97 F | RESPIRATION RATE: 15 BRPM | OXYGEN SATURATION: 96 % | WEIGHT: 248.02 LBS | HEIGHT: 71 IN

## 2024-10-08 VITALS
SYSTOLIC BLOOD PRESSURE: 122 MMHG | OXYGEN SATURATION: 98 % | HEART RATE: 70 BPM | RESPIRATION RATE: 14 BRPM | DIASTOLIC BLOOD PRESSURE: 80 MMHG

## 2024-10-08 DIAGNOSIS — Z98.890 OTHER SPECIFIED POSTPROCEDURAL STATES: Chronic | ICD-10-CM

## 2024-10-08 DIAGNOSIS — Z90.49 ACQUIRED ABSENCE OF OTHER SPECIFIED PARTS OF DIGESTIVE TRACT: Chronic | ICD-10-CM

## 2024-10-08 DIAGNOSIS — K31.7 POLYP OF STOMACH AND DUODENUM: ICD-10-CM

## 2024-10-08 PROCEDURE — 88342 IMHCHEM/IMCYTCHM 1ST ANTB: CPT | Mod: 26

## 2024-10-08 PROCEDURE — 43254 EGD ENDO MUCOSAL RESECTION: CPT

## 2024-10-08 PROCEDURE — 88305 TISSUE EXAM BY PATHOLOGIST: CPT | Mod: 26

## 2024-10-08 PROCEDURE — 88341 IMHCHEM/IMCYTCHM EA ADD ANTB: CPT | Mod: 26

## 2024-10-08 DEVICE — CLIP HEMO INSTINCT PLUS ENDOSCOPIC: Type: IMPLANTABLE DEVICE | Status: FUNCTIONAL

## 2024-10-08 DEVICE — CLIP RESOLUTION 360 ULTRA 235CM 20/BX: Type: IMPLANTABLE DEVICE | Status: FUNCTIONAL

## 2024-10-08 RX ORDER — SODIUM CHLORIDE IRRIG SOLUTION 0.9 %
500 SOLUTION, IRRIGATION IRRIGATION
Refills: 0 | Status: DISCONTINUED | OUTPATIENT
Start: 2024-10-08 | End: 2024-10-22

## 2024-10-08 NOTE — ASU DISCHARGE PLAN (ADULT/PEDIATRIC) - NS MD DC FALL RISK RISK
For information on Fall & Injury Prevention, visit: [email or web address] OR  [email or web address] OR  https://www.cdc.gov/steadi/patient.html

## 2024-10-08 NOTE — PRE PROCEDURE NOTE - PRE PROCEDURE EVALUATION
HPI:    Here for endoscopic ultrasound for 1yr surveillance of grade 1 NET    PAST MEDICAL & SURGICAL HISTORY:  HTN (hypertension)      Hypothyroidism      Vitamin B12 deficiency anemia      Gastric polyps      Obese      S/P excision of lipoma      History of cholecystectomy  10/15/2021      History of colonoscopy with polypectomy  endoscopy , 08/2021        See chart.    MEDICATIONS:    See chart.     ALLERGIES:  Tree pollen (Rhinorrhea; Rhinitis)  shellfish (Swelling)  penicillins (Unknown)  dust (Rhinorrhea; Rhinitis)    See chart.     SOCIAL HISTORY:     Denies toxic habits.     FAMILY HISTORY:  Family history of hypertension (Mother)    Family history of prostate cancer (Father)    Family history of diabetes mellitus  dad, insulin dependent      Noncontributory.     REVIEW OF SYSTEMS:  CONSTITUTIONAL: No weakness, fevers or chills.  EYES/ENT: No visual changes;  No vertigo or throat pain.  NECK: No pain or stiffness.  RESPIRATORY: No cough, wheezing, hemoptysis; No shortness of breath.  CARDIOVASCULAR: No chest pain or palpitations.  GASTROINTESTINAL: As per HPI.   GENITOURINARY: No dysuria, frequency or hematuria.  NEUROLOGICAL: No numbness or weakness.  SKIN: No itching, rashes.      PHYSICAL EXAM:  VITAL SIGNS:  T(C): 36 (10-08-24 @ 08:45), Max: 36 (10-08-24 @ 08:45)  HR: -- 77  BP: -- 139/97  RR: -- 13  SpO2: --97%  I/Os:     See chart.     GENERAL: JOYCE, non toxic, comfortable in bed  HEENT: EOMI, no icterus, no tracheal deviation, moist mucus membranes   CARDIO: Regular rate and rhythm, no murmurs, rubs or gallops  LUNGS: No wheezing, rales or rhonchi  ABDOMEN: Soft, non tender, non distended, no rebound or guarding  VASCULAR: Warm and well perfused without peripheral edema and palpable pulses  PSYCH AAO x 3, normal mood and affect   SKIN: warm, dry, intact     LABS:                 RADIOLOGY & ADDITIONAL TESTS: Reviewed.       Risks, benefits, and alternatives of the procedure discussed at length including but not limited to bleeding, perforation, anesthesia related complication, infection, etc. Patient expressed understanding and agreeable for procedure. Patient stable for planned procedure.

## 2024-10-08 NOTE — ASU PATIENT PROFILE, ADULT - FALL HARM RISK - UNIVERSAL INTERVENTIONS
Bed in lowest position, wheels locked, appropriate side rails in place/Call bell, personal items and telephone in reach/Instruct patient to call for assistance before getting out of bed or chair/Non-slip footwear when patient is out of bed/[place] to call system/Physically safe environment - no spills, clutter or unnecessary equipment/Purposeful Proactive Rounding/Room/bathroom lighting operational, light cord in reach

## 2024-10-15 ENCOUNTER — NON-APPOINTMENT (OUTPATIENT)
Age: 54
End: 2024-10-15

## 2024-10-15 LAB — SURGICAL PATHOLOGY STUDY: SIGNIFICANT CHANGE UP

## 2025-03-18 ENCOUNTER — EMERGENCY (EMERGENCY)
Facility: HOSPITAL | Age: 55
LOS: 1 days | Discharge: HOME | End: 2025-03-18
Attending: EMERGENCY MEDICINE
Payer: COMMERCIAL

## 2025-03-18 VITALS
TEMPERATURE: 98 F | OXYGEN SATURATION: 98 % | DIASTOLIC BLOOD PRESSURE: 90 MMHG | HEART RATE: 65 BPM | RESPIRATION RATE: 16 BRPM | SYSTOLIC BLOOD PRESSURE: 132 MMHG

## 2025-03-18 VITALS
HEIGHT: 71 IN | HEART RATE: 69 BPM | SYSTOLIC BLOOD PRESSURE: 136 MMHG | OXYGEN SATURATION: 96 % | RESPIRATION RATE: 14 BRPM | WEIGHT: 240.08 LBS | DIASTOLIC BLOOD PRESSURE: 72 MMHG | TEMPERATURE: 98 F

## 2025-03-18 DIAGNOSIS — Z98.890 OTHER SPECIFIED POSTPROCEDURAL STATES: Chronic | ICD-10-CM

## 2025-03-18 DIAGNOSIS — Z90.49 ACQUIRED ABSENCE OF OTHER SPECIFIED PARTS OF DIGESTIVE TRACT: Chronic | ICD-10-CM

## 2025-03-18 LAB
ALBUMIN SERPL ELPH-MCNC: 4.3 G/DL — SIGNIFICANT CHANGE UP (ref 3.3–5)
ALP SERPL-CCNC: 75 U/L — SIGNIFICANT CHANGE UP (ref 40–120)
ALT FLD-CCNC: 17 U/L — SIGNIFICANT CHANGE UP (ref 10–45)
ANION GAP SERPL CALC-SCNC: 12 MMOL/L — SIGNIFICANT CHANGE UP (ref 5–17)
AST SERPL-CCNC: 19 U/L — SIGNIFICANT CHANGE UP (ref 10–40)
BASOPHILS # BLD AUTO: 0 K/UL — SIGNIFICANT CHANGE UP (ref 0–0.2)
BASOPHILS NFR BLD AUTO: 0 % — SIGNIFICANT CHANGE UP (ref 0–2)
BILIRUB SERPL-MCNC: 0.5 MG/DL — SIGNIFICANT CHANGE UP (ref 0.2–1.2)
BUN SERPL-MCNC: 19 MG/DL — SIGNIFICANT CHANGE UP (ref 7–23)
CALCIUM SERPL-MCNC: 9.4 MG/DL — SIGNIFICANT CHANGE UP (ref 8.4–10.5)
CHLORIDE SERPL-SCNC: 103 MMOL/L — SIGNIFICANT CHANGE UP (ref 96–108)
CO2 SERPL-SCNC: 23 MMOL/L — SIGNIFICANT CHANGE UP (ref 22–31)
CREAT SERPL-MCNC: 1.83 MG/DL — HIGH (ref 0.5–1.3)
CRP SERPL-MCNC: <3 MG/L — SIGNIFICANT CHANGE UP (ref 0–4)
EGFR: 44 ML/MIN/1.73M2 — LOW
EGFR: 44 ML/MIN/1.73M2 — LOW
EOSINOPHIL # BLD AUTO: 0.18 K/UL — SIGNIFICANT CHANGE UP (ref 0–0.5)
EOSINOPHIL NFR BLD AUTO: 4.5 % — SIGNIFICANT CHANGE UP (ref 0–6)
ERYTHROCYTE [SEDIMENTATION RATE] IN BLOOD: 12 MM/HR — SIGNIFICANT CHANGE UP (ref 0–20)
GLUCOSE SERPL-MCNC: 101 MG/DL — HIGH (ref 70–99)
HCT VFR BLD CALC: 38.8 % — LOW (ref 39–50)
HGB BLD-MCNC: 12.2 G/DL — LOW (ref 13–17)
LYMPHOCYTES # BLD AUTO: 2.49 K/UL — SIGNIFICANT CHANGE UP (ref 1–3.3)
LYMPHOCYTES # BLD AUTO: 61.6 % — HIGH (ref 13–44)
MANUAL SMEAR VERIFICATION: SIGNIFICANT CHANGE UP
MCHC RBC-ENTMCNC: 22.3 PG — LOW (ref 27–34)
MCHC RBC-ENTMCNC: 31.4 G/DL — LOW (ref 32–36)
MCV RBC AUTO: 71.1 FL — LOW (ref 80–100)
MONOCYTES # BLD AUTO: 0.25 K/UL — SIGNIFICANT CHANGE UP (ref 0–0.9)
MONOCYTES NFR BLD AUTO: 6.2 % — SIGNIFICANT CHANGE UP (ref 2–14)
NEUTROPHILS # BLD AUTO: 1.12 K/UL — LOW (ref 1.8–7.4)
NEUTROPHILS NFR BLD AUTO: 27.7 % — LOW (ref 43–77)
PLAT MORPH BLD: NORMAL — SIGNIFICANT CHANGE UP
PLATELET # BLD AUTO: 259 K/UL — SIGNIFICANT CHANGE UP (ref 150–400)
POTASSIUM SERPL-MCNC: 4.3 MMOL/L — SIGNIFICANT CHANGE UP (ref 3.5–5.3)
POTASSIUM SERPL-SCNC: 4.3 MMOL/L — SIGNIFICANT CHANGE UP (ref 3.5–5.3)
PROT SERPL-MCNC: 7 G/DL — SIGNIFICANT CHANGE UP (ref 6–8.3)
RBC # BLD: 5.46 M/UL — SIGNIFICANT CHANGE UP (ref 4.2–5.8)
RBC # FLD: 15.5 % — HIGH (ref 10.3–14.5)
RBC BLD AUTO: SIGNIFICANT CHANGE UP
SODIUM SERPL-SCNC: 138 MMOL/L — SIGNIFICANT CHANGE UP (ref 135–145)
WBC # BLD: 4.04 K/UL — SIGNIFICANT CHANGE UP (ref 3.8–10.5)
WBC # FLD AUTO: 4.04 K/UL — SIGNIFICANT CHANGE UP (ref 3.8–10.5)

## 2025-03-18 PROCEDURE — 99284 EMERGENCY DEPT VISIT MOD MDM: CPT

## 2025-03-18 PROCEDURE — 99284 EMERGENCY DEPT VISIT MOD MDM: CPT | Mod: 25

## 2025-03-18 PROCEDURE — 73110 X-RAY EXAM OF WRIST: CPT

## 2025-03-18 PROCEDURE — 80053 COMPREHEN METABOLIC PANEL: CPT

## 2025-03-18 PROCEDURE — 86140 C-REACTIVE PROTEIN: CPT

## 2025-03-18 PROCEDURE — 73110 X-RAY EXAM OF WRIST: CPT | Mod: 26,LT

## 2025-03-18 PROCEDURE — 85652 RBC SED RATE AUTOMATED: CPT

## 2025-03-18 PROCEDURE — 73130 X-RAY EXAM OF HAND: CPT | Mod: 26,LT

## 2025-03-18 PROCEDURE — 85025 COMPLETE CBC W/AUTO DIFF WBC: CPT

## 2025-03-18 PROCEDURE — 73130 X-RAY EXAM OF HAND: CPT

## 2025-03-18 RX ORDER — IBUPROFEN 200 MG
600 TABLET ORAL ONCE
Refills: 0 | Status: COMPLETED | OUTPATIENT
Start: 2025-03-18 | End: 2025-03-18

## 2025-03-18 RX ADMIN — Medication 600 MILLIGRAM(S): at 09:27

## 2025-03-18 RX ADMIN — Medication 600 MILLIGRAM(S): at 08:54

## 2025-03-18 NOTE — ED PROVIDER NOTE - ATTENDING APP SHARED VISIT CONTRIBUTION OF CARE
54-year-old male history of blood pressure hypothyroidism presenting with left hand pain states started yesterday he noticed it in his pinky like a sharp pain no skin changes but today got worse with mild swelling involving the finger with some mild pain with passive flexion no fevers or chills no skin changes.  films, labs, no h/o arthropathies, analgesia, outpt hand follow up.

## 2025-03-18 NOTE — ED PROVIDER NOTE - PATIENT PORTAL LINK FT
You can access the FollowMyHealth Patient Portal offered by [facility] by registering at the following website: [email or web address]. By joining [employer]’s FollowMyHealth portal, you will also be able to view your health information using other applications (apps) compatible with our system.

## 2025-03-18 NOTE — ED PROVIDER NOTE - PROGRESS NOTE DETAILS
Cr baseline. L hand xray reviewed, pt w/o TTP to L first digit. Findings likely cw old fracture. Pt aware to f/u with hand. Recommended tylenol for pain control and to elevate extremity. Patient stable for discharge.  Follow up instructions given, return to ED precautions reviewed. Importance of follow up emphasized, patient verbalized understanding.  All questions answered. Jordana Delatorre PA-C

## 2025-03-18 NOTE — ED PROVIDER NOTE - NSFOLLOWUPINSTRUCTIONS_ED_ALL_ED_FT
Please make sure to follow up with your primary care doctor within 1-2 days and with the HAND specialist. The ED referral coordinator will call you with appointment details.  Bring a copy of all of your results with you to your follow up appointments.   Return to the ER as discussed if you develop any new or worsening symptoms including but not limited to worsening swelling, pain or redness of hand/extremity.     You may take Tylenol 1000mg every 6 hours as needed for pain.  Try to rest your hand and limit use. Keep extremity elevated to the level above your heart.

## 2025-03-18 NOTE — ED ADULT NURSE NOTE - OBJECTIVE STATEMENT
Patient  is  alert  and  oriented  x4.  Color  is  good  and  skin  warm  to  touch.  He  is  c/o  pain  to  his  left  5th  finger.  Swelling  noted  to  his  left  hand.  Patien denies  any  fall or  injury.

## 2025-03-18 NOTE — ED PROVIDER NOTE - OBJECTIVE STATEMENT
53 yo M with a PMH of CAD, HTN, thyroid disease p/w atruaumatic left hand and wrist pain/swelling since last night. Pain mostly to 5th digit, described as sharp. Can extend and flex fingers but reports pain with doins so. Has been taking OTC analgesics with minimal relief. Did not taken anything today. No arm swelling. Denies nausea, vomiting, fever, chills, chest pain, SOB, palpitations, leg pain/swelling. R hand dominant. Uses his hands at work frequently as he is an .

## 2025-03-21 NOTE — H&P PST ADULT - RESPIRATORY
Call primary care provider for follow up after discharge/Activities as tolerated/Low salt diet/Monitor weight daily/Report signs and symptoms to primary care provider Breath Sounds equal & clear to percussion & auscultation, no accessory muscle use detailed exam

## (undated) DEVICE — BASIN EMESIS 10IN GRADUATED MAUVE

## (undated) DEVICE — CLAMP BX HOT RAD JAW 3

## (undated) DEVICE — CATH IV SAFE BC 22G X 1" (BLUE)

## (undated) DEVICE — GOWN LG

## (undated) DEVICE — DRSG CURITY GAUZE SPONGE 4 X 4" 12-PLY NON-STERILE

## (undated) DEVICE — LINE BREATHE SAMPLNG

## (undated) DEVICE — BITE BLOCK ADULT 20 X 27MM (GREEN)

## (undated) DEVICE — BIOPSY FORCEP RADIAL JAW 4 STANDARD WITH NEEDLE

## (undated) DEVICE — CATH BLLN ULTRASONIC ENSOSCOPE

## (undated) DEVICE — TUBING IV SET GRAVITY 3Y 100" MACRO

## (undated) DEVICE — ELCTR ECG CONDUCTIVE ADHESIVE

## (undated) DEVICE — DRSG 2X2

## (undated) DEVICE — BIOPSY FORCEP COLD DISP

## (undated) DEVICE — Device

## (undated) DEVICE — PACK IV START WITH CHG

## (undated) DEVICE — DENTURE CUP PINK

## (undated) DEVICE — SNARE DUETTE SOFT HEXAGONAL 5FR 240X1.5X2.5CM

## (undated) DEVICE — LUBRICATING JELLY HR ONE SHOT 3G

## (undated) DEVICE — SNARE EXACTO COLD 9MMX230CM

## (undated) DEVICE — CONTAINER FORMALIN 10% 20ML

## (undated) DEVICE — UNDERPAD LINEN SAVER 17 X 24"

## (undated) DEVICE — POLY TRAP ETRAP

## (undated) DEVICE — SALIVA EJECTOR (BLUE)

## (undated) DEVICE — CONTAINER FORMALIN 80ML YELLOW

## (undated) DEVICE — DRSG BANDAID 0.75X3"

## (undated) DEVICE — TUBING MEDI-VAC W MAXIGRIP CONNECTORS 1/4"X6'

## (undated) DEVICE — FACESHIELD FULL VISOR

## (undated) DEVICE — SNARE LESIONHUNTER ROTAT NITNL COLD 10MM

## (undated) DEVICE — SNARE POLYP HEXAGONAL SM 13X2.4X240